# Patient Record
Sex: MALE | Race: WHITE | Employment: FULL TIME | URBAN - METROPOLITAN AREA
[De-identification: names, ages, dates, MRNs, and addresses within clinical notes are randomized per-mention and may not be internally consistent; named-entity substitution may affect disease eponyms.]

---

## 2017-07-13 RX ORDER — METOPROLOL SUCCINATE 25 MG/1
TABLET, EXTENDED RELEASE ORAL
Qty: 90 TAB | Refills: 2 | Status: SHIPPED | OUTPATIENT
Start: 2017-07-13 | End: 2018-04-10 | Stop reason: SDUPTHER

## 2017-10-13 DIAGNOSIS — N52.9 ERECTILE DYSFUNCTION, UNSPECIFIED ERECTILE DYSFUNCTION TYPE: ICD-10-CM

## 2017-10-13 RX ORDER — SILDENAFIL CITRATE 100 MG/1
TABLET, FILM COATED ORAL
Qty: 10 TAB | Refills: 5 | Status: SHIPPED | OUTPATIENT
Start: 2017-10-13 | End: 2017-10-17 | Stop reason: SINTOL

## 2017-10-17 ENCOUNTER — OFFICE VISIT (OUTPATIENT)
Dept: FAMILY MEDICINE CLINIC | Age: 47
End: 2017-10-17

## 2017-10-17 VITALS
WEIGHT: 175.4 LBS | DIASTOLIC BLOOD PRESSURE: 75 MMHG | OXYGEN SATURATION: 99 % | BODY MASS INDEX: 26.58 KG/M2 | RESPIRATION RATE: 18 BRPM | SYSTOLIC BLOOD PRESSURE: 112 MMHG | HEIGHT: 68 IN | HEART RATE: 60 BPM | TEMPERATURE: 97.7 F

## 2017-10-17 DIAGNOSIS — T50.905A ADVERSE EFFECT OF DRUG, INITIAL ENCOUNTER: ICD-10-CM
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
## 2017-10-17 DIAGNOSIS — G43.919 INTRACTABLE MIGRAINE WITHOUT STATUS MIGRAINOSUS, UNSPECIFIED MIGRAINE TYPE: ICD-10-CM

## 2017-10-17 DIAGNOSIS — N52.9 ERECTILE DYSFUNCTION, UNSPECIFIED ERECTILE DYSFUNCTION TYPE: Primary | ICD-10-CM

## 2017-10-17 DIAGNOSIS — E78.2 MIXED HYPERLIPIDEMIA: ICD-10-CM

## 2017-10-17 RX ORDER — TADALAFIL 20 MG/1
20 TABLET ORAL
Qty: 20 TAB | Refills: 1 | Status: SHIPPED | OUTPATIENT
Start: 2017-10-17 | End: 2018-10-26 | Stop reason: SDUPTHER

## 2017-10-17 RX ORDER — SUMATRIPTAN 100 MG/1
TABLET, FILM COATED ORAL
Qty: 9 TAB | Refills: 5 | Status: SHIPPED | OUTPATIENT
Start: 2017-10-17 | End: 2018-06-13 | Stop reason: SDUPTHER

## 2017-10-17 NOTE — MR AVS SNAPSHOT
Visit Information Date & Time Provider Department Dept. Phone Encounter #  
 10/17/2017 11:20 AM Yoni Carney NP [facility] [phone] [facility] [id number] Follow-up Instructions Return in about 4 months (around 2/17/2018), or if symptoms worsen or fail to improve, for hld. Upcoming Health Maintenance Date Due DTaP/Tdap/Td series (1 - Tdap) 10/10/1991 INFLUENZA AGE 9 TO ADULT 8/1/2017 Allergies as of 10/17/2017  Review Complete On: 10/17/2017 By: Mikel Crisostomo LPN Severity Noted Reaction Type Reactions Viagra [Sildenafil]  10/17/2017    Other (comments)  
 headaches Current Immunizations  Reviewed on 9/22/2015 No immunizations on file. Not reviewed this visit You Were Diagnosed With   
  
 Codes Comments Erectile dysfunction, unspecified erectile dysfunction type    -  Primary ICD-10-CM: N52.9 ICD-9-CM: 607.84 Adverse effect of drug, initial encounter     ICD-10-CM: T88. 7XXA ICD-9-CM: E947.9 Tick bite, initial encounter     ICD-10-CM: W57. Jimmye Rise ICD-9-CM: 919.4, E906.4 Mixed hyperlipidemia     ICD-10-CM: E78.2 ICD-9-CM: 272.2 Intractable migraine without status migrainosus, unspecified migraine type     ICD-10-CM: G43.919 ICD-9-CM: 346.91 Vitals BP Pulse Temp Resp Height(growth percentile) Weight(growth percentile) 112/75 (BP 1 Location: Left arm, BP Patient Position: Sitting) 60 97.7 °F (36.5 °C) (Oral) 18 5' 8\" (1.727 m) 175 lb 6.4 oz (79.6 kg) SpO2 BMI Smoking Status 99% 26.67 kg/m2 Never Smoker BMI and BSA Data Body Mass Index Body Surface Area  
 26.67 kg/m 2 1.95 m 2 Preferred Pharmacy Pharmacy Name Phone [id number] [address] [address] [phone] Your Updated Medication List  
  
   
This list is accurate as of: 10/17/17 12:24 PM.  Always use your most recent med list.  
  
  
  
  
 metoprolol succinate 25 mg XL tablet Commonly known as:  TOPROL-XL  
take 1 tablet by mouth once daily  
  
 simvastatin 10 mg tablet Commonly known as:  ZOCOR  
take 1 tablet by mouth at bedtime SUMAtriptan 100 mg tablet Commonly known as:  IMITREX  
TAKE 1 TABLET BY MOUTH DAILY IF NEEDED MIGRAINE  
  
 tadalafil 20 mg tablet Commonly known as:  CIALIS Take 1 Tab by mouth daily as needed. Prescriptions Sent to Pharmacy Refills  
 tadalafil (CIALIS) 20 mg tablet 1 Sig: Take 1 Tab by mouth daily as needed. Class: Normal  
 [facility] [place] [address] [facility], [address] [place] [address] Ph #: [phone] Route: Oral  
 SUMAtriptan (IMITREX) 100 mg tablet 5 Sig: TAKE 1 TABLET BY MOUTH DAILY IF NEEDED MIGRAINE Class: Normal  
 Pharmacy: [facility] [place] [address] [facility], [address] [place] [address] Ph #: [phone] Follow-up Instructions Return in about 4 months (around 2/17/2018), or if symptoms worsen or fail to improve, for hld. To-Do List   
 10/17/2017 Lab:  LIPID PANEL   
  
 10/17/2017 Lab:  LYME AB TOTAL W/RFLX W BLOT   
  
 10/17/2017 Lab:  METABOLIC PANEL, COMPREHENSIVE Patient Instructions Tadalafil (By mouth) Tadalafil (iq-QFQ-x-arben) Treats erectile dysfunction and the symptoms of benign prostatic hyperplasia (enlarged prostate). Also treats pulmonary arterial hypertension (high blood pressure in the lungs). Brand Name(s): Adcirca, Cialis There may be other brand names for this medicine. When This Medicine Should Not Be Used: This medicine is not right for everyone. Do not use it if you had an allergic reaction to tadalafil. How to Use This Medicine:  
Tablet · Take your medicine as directed. Your dose may need to be changed several times to find what works best for you. · Swallow the tablet whole. Do not split, break, or crush it. · Read and follow the patient instructions that come with this medicine. Talk to your doctor or pharmacist if you have any questions. · Missed dose: If you take this medicine once a day, take a missed dose as soon as you remember. If it is almost time for your next dose, wait until then and take a regular dose. Do not take extra medicine to make up for a missed dose. · Store the medicine in a closed container at room temperature, away from heat, moisture, and direct light. Drugs and Foods to Avoid: Ask your doctor or pharmacist before using any other medicine, including over-the-counter medicines, vitamins, and herbal products. · Do not use this medicine if you also use riociguat or a nitrate medicine. Do not take other medicines that contain tadalafil or similar medicines, such as sildenafil or vardenafil. · Some medicines and foods can affect how tadalafil works. Tell your doctor if you are using any of the following: ¨ Bosentan, carbamazepine, erythromycin, indinavir, itraconazole, ketoconazole, phenobarbital, phenytoin, rifampin, ritonavir ¨ Medicine to treat prostate problems or high blood pressure (including alfuzosin, amlodipine, bendroflumethiazide, candesartan, doxazosin, enalapril, losartan, metoprolol, tamsulosin, terazosin) · Do not eat grapefruit or drink grapefruit juice while you are using this medicine. Warnings While Using This Medicine: · Tell your doctor if you are pregnant or breastfeeding, or if you have kidney disease, liver disease, diabetes, high cholesterol, leukemia, multiple myeloma, sickle cell anemia, bleeding problems, a stomach ulcer, or eye problems. Tell your doctor if you have angina or chest pain during sex, heart disease, heart rhythm problems, high or low blood pressure, or a history of heart attack or stroke. Also tell your doctor if you smoke or drink alcohol. · Tell any doctor who treats you that you take tadalafil. · This medicine may cause the following problems:  
¨ Low blood pressure (especially if taken with other medicines that lower blood pressure) ¨ Painful or prolonged erection ¨ Hearing or vision problems · Keep all medicine out of the reach of children. Never share your medicine with anyone. Possible Side Effects While Using This Medicine:  
Call your doctor right away if you notice any of these side effects: · Allergic reaction: Itching or hives, swelling in your face or hands, swelling or tingling in your mouth or throat, chest tightness, trouble breathing · Blistering, peeling, or red skin rash · Chest pain · Lightheadedness, fainting · Painful erection or an erection that lasts longer than 4 hours · Sudden decrease in hearing or hearing loss, ringing in the ears, dizziness · Sudden loss of vision If you notice these less serious side effects, talk with your doctor: · Back or muscle pain · Headache · Stuffy or runny nose · Upset stomach, nausea · Warmth or redness in your face, neck, arms, or upper chest 
If you notice other side effects that you think are caused by this medicine, tell your doctor. Call your doctor for medical advice about side effects. You may report side effects to FDA at 9-636-FDA-0426 © 2017 [facility] Information is for End User's use only and may not be sold, redistributed or otherwise used for commercial purposes. The above information is an  only. It is not intended as medical advice for individual conditions or treatments. Talk to your doctor, nurse or pharmacist before following any medical regimen to see if it is safe and effective for you. Introducing [facility]! Dear Yehuda Young: Thank you for requesting a Suncore account. Our records indicate that you already have an active Suncore account. You can access your account anytime at [email or web address] Did you know that you can access your hospital and ER discharge instructions at any time in Appuri? You can also review all of your test results from your hospital stay or ER visit. Additional Information If you have questions, please visit the Frequently Asked Questions section of the Appuri website at [email or web address] Remember, Appoett is NOT to be used for urgent needs. For medical emergencies, dial 911. Now available from your iPhone and Android! Please provide this summary of care documentation to your next provider. Lyme Disease Testing Disclaimer:   
 § 83.2-6428.7. (Expires July 1, 2018) Lyme disease testing information disclosure. A. Every licensee or his in-office designee who orders a laboratory test for the presence of Lyme disease shall provide to the patient or his legal representative the following written information: \"ACCORDING TO THE CENTERS FOR DISEASE CONTROL AND PREVENTION, AS OF 2011 LYME DISEASE IS THE SIXTH FASTEST GROWING DISEASE IN THE [place]. YOUR HEALTH CARE PROVIDER HAS ORDERED A LABORATORY TEST FOR THE PRESENCE OF LYME DISEASE FOR YOU. CURRENT LABORATORY TESTING FOR LYME DISEASE CAN BE PROBLEMATIC AND STANDARD LABORATORY TESTS OFTEN RESULT IN FALSE NEGATIVE AND FALSE POSITIVE RESULTS, AND IF DONE TOO EARLY, YOU MAY NOT HAVE PRODUCED ENOUGH ANTIBODIES TO BE CONSIDERED POSITIVE BECAUSE YOUR IMMUNE RESPONSE REQUIRES TIME TO DEVELOP ANTIBODIES. IF YOU ARE TESTED FOR LYME DISEASE, AND THE RESULTS ARE NEGATIVE, THIS DOES NOT NECESSARILY MEAN YOU DO NOT HAVE LYME DISEASE. IF YOU CONTINUE TO EXPERIENCE SYMPTOMS, YOU SHOULD CONTACT YOUR HEALTH CARE PROVIDER AND INQUIRE ABOUT THE APPROPRIATENESS OF RETESTING OR ADDITIONAL TREATMENT. \"  
B. Licensees shall be immune from civil liability for the provision of the written information required by this section absent gross negligence or willful misconduct. Your primary care clinician is listed as Palomo Douglas. If you have any questions after today's visit, please call [phone].

## 2017-10-17 NOTE — PATIENT INSTRUCTIONS
Tadalafil (By mouth)   Tadalafil (mk-THZ-g-arben)  Treats erectile dysfunction and the symptoms of benign prostatic hyperplasia (enlarged prostate). Also treats pulmonary arterial hypertension (high blood pressure in the lungs). Brand Name(s): Adcirca, Cialis   There may be other brand names for this medicine. When This Medicine Should Not Be Used: This medicine is not right for everyone. Do not use it if you had an allergic reaction to tadalafil. How to Use This Medicine:   Tablet  · Take your medicine as directed. Your dose may need to be changed several times to find what works best for you. · Swallow the tablet whole. Do not split, break, or crush it. · Read and follow the patient instructions that come with this medicine. Talk to your doctor or pharmacist if you have any questions. · Missed dose: If you take this medicine once a day, take a missed dose as soon as you remember. If it is almost time for your next dose, wait until then and take a regular dose. Do not take extra medicine to make up for a missed dose. · Store the medicine in a closed container at room temperature, away from heat, moisture, and direct light. Drugs and Foods to Avoid:   Ask your doctor or pharmacist before using any other medicine, including over-the-counter medicines, vitamins, and herbal products. · Do not use this medicine if you also use riociguat or a nitrate medicine. Do not take other medicines that contain tadalafil or similar medicines, such as sildenafil or vardenafil. · Some medicines and foods can affect how tadalafil works.  Tell your doctor if you are using any of the following:  ¨ Bosentan, carbamazepine, erythromycin, indinavir, itraconazole, ketoconazole, phenobarbital, phenytoin, rifampin, ritonavir  ¨ Medicine to treat prostate problems or high blood pressure (including alfuzosin, amlodipine, bendroflumethiazide, candesartan, doxazosin, enalapril, losartan, metoprolol, tamsulosin, terazosin)  · Do not eat grapefruit or drink grapefruit juice while you are using this medicine. Warnings While Using This Medicine:   · Tell your doctor if you are pregnant or breastfeeding, or if you have kidney disease, liver disease, diabetes, high cholesterol, leukemia, multiple myeloma, sickle cell anemia, bleeding problems, a stomach ulcer, or eye problems. Tell your doctor if you have angina or chest pain during sex, heart disease, heart rhythm problems, high or low blood pressure, or a history of heart attack or stroke. Also tell your doctor if you smoke or drink alcohol. · Tell any doctor who treats you that you take tadalafil. · This medicine may cause the following problems:   ¨ Low blood pressure (especially if taken with other medicines that lower blood pressure)  ¨ Painful or prolonged erection  ¨ Hearing or vision problems  · Keep all medicine out of the reach of children. Never share your medicine with anyone. Possible Side Effects While Using This Medicine:   Call your doctor right away if you notice any of these side effects:  · Allergic reaction: Itching or hives, swelling in your face or hands, swelling or tingling in your mouth or throat, chest tightness, trouble breathing  · Blistering, peeling, or red skin rash  · Chest pain  · Lightheadedness, fainting  · Painful erection or an erection that lasts longer than 4 hours  · Sudden decrease in hearing or hearing loss, ringing in the ears, dizziness  · Sudden loss of vision  If you notice these less serious side effects, talk with your doctor:   · Back or muscle pain  · Headache  · Stuffy or runny nose  · Upset stomach, nausea  · Warmth or redness in your face, neck, arms, or upper chest  If you notice other side effects that you think are caused by this medicine, tell your doctor. Call your doctor for medical advice about side effects.  You may report side effects to FDA at 7-248-FDA-1147  © 2017 [address] Information is for End User's use only and may not be sold, redistributed or otherwise used for commercial purposes. The above information is an  only. It is not intended as medical advice for individual conditions or treatments. Talk to your doctor, nurse or pharmacist before following any medical regimen to see if it is safe and effective for you.

## 2017-10-17 NOTE — PROGRESS NOTES
Woody Wilson is a 52 y.o. male   Chief Complaint   Patient presents with    Tick Removal     \"I got bit by a tick a couple of weeks ago\"     Chief Complaint   Patient presents with    Tick Removal     \"I got bit by a tick a couple of weeks ago\"   1. Have you been to the ER, urgent care clinic since your last visit? Hospitalized since your last visit? No    2. Have you seen or consulted any other health care providers outside of the [address] [place] [address] since your last visit? Include any pap smears or colon screening.  No

## 2017-10-18 LAB
A-G RATIO,AGRAT: 2.1 RATIO (ref 1.1–2.6)
ALBUMIN SERPL-MCNC: 5 G/DL (ref 3.5–5)
ALP SERPL-CCNC: 51 U/L (ref 25–115)
ALT SERPL-CCNC: 21 U/L (ref 5–40)
ANION GAP SERPL CALC-SCNC: 18 MMOL/L
AST SERPL W P-5'-P-CCNC: 22 U/L (ref 10–37)
BILIRUB SERPL-MCNC: 0.5 MG/DL (ref 0.2–1.2)
BUN SERPL-MCNC: 17 MG/DL (ref 6–22)
CALCIUM SERPL-MCNC: 9.8 MG/DL (ref 8.4–10.4)
CHLORIDE SERPL-SCNC: 99 MMOL/L (ref 98–110)
CHOLEST SERPL-MCNC: 207 MG/DL (ref 110–200)
CO2 SERPL-SCNC: 25 MMOL/L (ref 20–32)
CREAT SERPL-MCNC: 1 MG/DL (ref 0.5–1.2)
GFRAA, 66117: >60
GFRNA, 66118: >60
GLOBULIN,GLOB: 2.4 G/DL (ref 2–4)
GLUCOSE SERPL-MCNC: 86 MG/DL (ref 70–99)
HDLC SERPL-MCNC: 57 MG/DL (ref 40–59)
LDLC SERPL CALC-MCNC: 133 MG/DL (ref 50–99)
POTASSIUM SERPL-SCNC: 4.3 MMOL/L (ref 3.5–5.5)
PROT SERPL-MCNC: 7.4 G/DL (ref 6.4–8.3)
SODIUM SERPL-SCNC: 142 MMOL/L (ref 133–145)
TRIGL SERPL-MCNC: 87 MG/DL (ref 40–149)
VLDLC SERPL CALC-MCNC: 17 MG/DL (ref 8–30)

## 2017-10-18 NOTE — PROGRESS NOTES
1. Have you been to the ER, urgent care clinic since your last visit? Hospitalized since your last visit? No    2. Have you seen or consulted any other health care providers outside of the [address] [place] [address] since your last visit? Include any pap smears or colon screening. No  Subjective:   Cayetano Median is a 52 y.o. male follow up hypercholesterolemia  Erectile dysfunction   Medication reaction to silenafil headaches per patient   ROS: denies chest pain, dyspnea, denies urinary symptoms  Current Outpatient Prescriptions   Medication Sig Dispense Refill    tadalafil (CIALIS) 20 mg tablet Take 1 Tab by mouth daily as needed. 20 Tab 1    SUMAtriptan (IMITREX) 100 mg tablet TAKE 1 TABLET BY MOUTH DAILY IF NEEDED MIGRAINE 9 Tab 5    metoprolol succinate (TOPROL-XL) 25 mg XL tablet take 1 tablet by mouth once daily 90 Tab 2    simvastatin (ZOCOR) 10 mg tablet take 1 tablet by mouth at bedtime 90 Tab 3         New concerns: bit by a tic on left side several weeks ago. Area itches and is slightly tender per patient. ROS: denies fever,chills, flu like symtoms  Objective:   Awake and alert in no acute distress  Neck supple without lymphadenopathy, no carotid artery bruits auscultated bilaterally. No thyromegaly  Lungs clear throughout  S1 S2 RRR without ectopy or murmur auscultated. Extremities: no clubbing, cyanosis, peripheral edema  Integumentary: scabbed insect bite lateral thorax mid region non tender  Visit Vitals    /75 (BP 1 Location: Left arm, BP Patient Position: Sitting)    Pulse 60    Temp 97.7 °F (36.5 °C) (Oral)    Resp 18    Ht 5' 8\" (1.727 m)    Wt 175 lb 6.4 oz (79.6 kg)    SpO2 99%    BMI 26.67 kg/m2    Diagnoses and all orders for this visit:    1. Erectile dysfunction, unspecified erectile dysfunction type  -     tadalafil (CIALIS) 20 mg tablet; Take 1 Tab by mouth daily as needed. 2. Adverse effect of drug, initial encounter  Stop viagra  3.  Tick bite, initial encounter  - LYME AB TOTAL W/RFLX W BLOT; Future    4. Mixed hyperlipidemia  -     LIPID PANEL; Future  -     METABOLIC PANEL, COMPREHENSIVE; Future    5. Intractable migraine without status migrainosus, unspecified migraine type refill  -     SUMAtriptan (IMITREX) 100 mg tablet; TAKE 1 TABLET BY MOUTH DAILY IF NEEDED MIGRAINE    Reviewed risks and benefits and common side effects of new medication  General comfort measures   mediterranean diet introduced patient verbalizes understanding    Patient verbalizes understanding. I have discussed the diagnosis with the patient and the intended plan as seen in the above orders. The patient has received an after-visit summary and questions were answered concerning future plans. I have discussed medication side effects and warnings with the patient as well. Follow-up Disposition:  Return in about 4 months (around 2/17/2018), or if symptoms worsen or fail to improve, for hld.

## 2017-11-10 ENCOUNTER — TELEPHONE (OUTPATIENT)
Dept: FAMILY MEDICINE CLINIC | Age: 47
End: 2017-11-10

## 2017-11-10 NOTE — TELEPHONE ENCOUNTER
Pt had labs done 10/17 and pt never received a call about his results. Pt want to know if pcp could review the results give him a call back asap.

## 2017-11-10 NOTE — TELEPHONE ENCOUNTER
Steve Mayes NP   to Zak Osman           10/24/17 2:00 PM   Labs stable---Keep up good work.   Jennifer Coronel 83

## 2017-12-29 DIAGNOSIS — E78.2 MIXED HYPERLIPIDEMIA: ICD-10-CM

## 2018-01-02 RX ORDER — SIMVASTATIN 10 MG/1
TABLET, FILM COATED ORAL
Qty: 90 TAB | Refills: 3 | Status: SHIPPED | OUTPATIENT
Start: 2018-01-02 | End: 2018-11-23 | Stop reason: SDUPTHER

## 2018-04-10 RX ORDER — METOPROLOL SUCCINATE 25 MG/1
TABLET, EXTENDED RELEASE ORAL
Qty: 90 TAB | Refills: 2 | Status: SHIPPED | OUTPATIENT
Start: 2018-04-10 | End: 2019-05-14 | Stop reason: SDUPTHER

## 2018-06-13 DIAGNOSIS — G43.919 INTRACTABLE MIGRAINE WITHOUT STATUS MIGRAINOSUS, UNSPECIFIED MIGRAINE TYPE: ICD-10-CM

## 2018-06-13 RX ORDER — SUMATRIPTAN 100 MG/1
TABLET, FILM COATED ORAL
Qty: 9 TAB | Refills: 5 | Status: SHIPPED | OUTPATIENT
Start: 2018-06-13 | End: 2018-11-26 | Stop reason: SDUPTHER

## 2018-08-14 ENCOUNTER — TELEPHONE (OUTPATIENT)
Dept: FAMILY MEDICINE CLINIC | Age: 48
End: 2018-08-14

## 2018-08-14 NOTE — TELEPHONE ENCOUNTER
Patient called in to check on the status of the prior auth for the medication cialis. Patient stated that form was faxed to the office. Please advise.

## 2018-08-14 NOTE — TELEPHONE ENCOUNTER
Prior authorization form has been sent to the insurance company for review. Left message for patient to return call to the office.

## 2018-08-20 NOTE — TELEPHONE ENCOUNTER
Pt states that his prior auth should have been submitted through [employer] he is not sure if it was sent correctly because he received a denial from [facility]

## 2018-08-20 NOTE — TELEPHONE ENCOUNTER
LDUMEQ:[id number];HPNVVS:[id number]; Review Type:Prior Auth; Coverage Start Date:07/21/2018; Coverage End Date:08/20/2019;

## 2018-10-26 DIAGNOSIS — N52.9 ERECTILE DYSFUNCTION, UNSPECIFIED ERECTILE DYSFUNCTION TYPE: ICD-10-CM

## 2018-10-26 RX ORDER — TADALAFIL 20 MG/1
20 TABLET ORAL
Qty: 20 TAB | Refills: 1 | Status: SHIPPED | OUTPATIENT
Start: 2018-10-26 | End: 2018-11-23

## 2018-10-26 NOTE — TELEPHONE ENCOUNTER
Requested Prescriptions     Pending Prescriptions Disp Refills    tadalafil (CIALIS) 20 mg tablet 20 Tab 1     Sig: Take 1 Tab by mouth daily as needed. Patient is requesting all prescriptions to go to [facility] from now on.

## 2018-10-29 ENCOUNTER — TELEPHONE (OUTPATIENT)
Dept: FAMILY MEDICINE CLINIC | Age: 48
End: 2018-10-29

## 2018-10-29 NOTE — TELEPHONE ENCOUNTER
Pt is now under wifes insurance and the insurance will not cover the strength of the 20 mg for the Cialis. If Pt wants to keep the 20mg he needs to file appeal b/c it is considered non formulary. If he does the 5mg it is covered but he'd need a prior auth, but he'd need more of a quanity b/c the dose is lowered. [phone] is his cell    [phone]  Number for appeal    [phone] number for prior [facility] is the new insurance. Please advise.

## 2018-11-01 NOTE — TELEPHONE ENCOUNTER
Called [facility] [employer] for appeal was redirected and was told to have patient call and initiate the appeal. Contacted Mr. Matthew Bishop to let him know that the appeal needed to be started it by him, patient verbalized understanding and would call back once appeal has been started.  -Tabby Sheehan LPN

## 2018-11-14 NOTE — TELEPHONE ENCOUNTER
Patient is calling to see if the doctor can write the Cialis medication for 5mg instead of 20mg and to have 30 tablets dispensed for a 30 day supply. He spoke with his insurance company and there is no appeal process to have the medication covered under his new insurance plan. He also states that he is willing to pay the out of pocket cost to receive the medication, however the 5 mg is cheaper and more affordable than the 20mg. Patients would like this medication also sent to the [place] [address]. IF there are any questions or concerns he asked to be contacted at [phone].

## 2018-11-23 ENCOUNTER — OFFICE VISIT (OUTPATIENT)
Dept: FAMILY MEDICINE CLINIC | Age: 48
End: 2018-11-23

## 2018-11-23 VITALS
BODY MASS INDEX: 28.98 KG/M2 | HEART RATE: 64 BPM | DIASTOLIC BLOOD PRESSURE: 90 MMHG | OXYGEN SATURATION: 99 % | WEIGHT: 191.2 LBS | HEIGHT: 68 IN | TEMPERATURE: 97.7 F | RESPIRATION RATE: 19 BRPM | SYSTOLIC BLOOD PRESSURE: 140 MMHG

## 2018-11-23 DIAGNOSIS — R35.0 URINARY FREQUENCY: ICD-10-CM

## 2018-11-23 DIAGNOSIS — R35.1 NOCTURIA: Primary | ICD-10-CM

## 2018-11-23 DIAGNOSIS — N52.9 ERECTILE DYSFUNCTION, UNSPECIFIED ERECTILE DYSFUNCTION TYPE: ICD-10-CM

## 2018-11-23 DIAGNOSIS — E78.2 MIXED HYPERLIPIDEMIA: ICD-10-CM

## 2018-11-23 PROBLEM — K64.9 HEMORRHOIDS: Status: ACTIVE | Noted: 2018-11-23

## 2018-11-23 RX ORDER — SIMVASTATIN 10 MG/1
TABLET, FILM COATED ORAL
Qty: 90 TAB | Refills: 3 | Status: SHIPPED | OUTPATIENT
Start: 2018-11-23 | End: 2019-05-14 | Stop reason: SDUPTHER

## 2018-11-23 RX ORDER — SIMVASTATIN 10 MG/1
TABLET, FILM COATED ORAL
Qty: 90 TAB | Refills: 3 | Status: SHIPPED | OUTPATIENT
Start: 2018-11-23 | End: 2018-11-23

## 2018-11-23 RX ORDER — TADALAFIL 5 MG/1
5 TABLET ORAL DAILY
Qty: 90 TAB | Refills: 3 | Status: SHIPPED | OUTPATIENT
Start: 2018-11-23 | End: 2020-01-23

## 2018-11-23 NOTE — PROGRESS NOTES
Chief Complaint   Patient presents with    Other     medication refill      1. Have you been to the ER, urgent care clinic since your last visit? Hospitalized since your last visit? No     2. Have you seen or consulted any other health care providers outside of the [address] [place] [address] since your last visit? Include any pap smears or colon screening.  No

## 2018-11-23 NOTE — PROGRESS NOTES
Subjective:   Stan Hayward is a 50 y.o. male he is here today for refill for Cialis which he takes for nocturia and urinary frequency and erectile dysfunction. He would like to be on Cialis 5 mg daily it helped him before with the nocturia and urinary frequency but  had to stop because of cost and lack of insurance. He is now under his wife's insurance and would like to discuss going back on Cialis 5 mg daily  Does urinate often at night but denies any urinary stream changes   Patient also would like a refill for simvastatin  Review of Systems   Constitutional: Negative. Cardiovascular: Negative. Genitourinary: Positive for frequency. Negative for dysuria, flank pain, hematuria and urgency. Current Outpatient Medications   Medication Sig Dispense Refill    SUMAtriptan (IMITREX) 100 mg tablet take 1 tablet by mouth daily if needed for MIFRAINE 9 Tab 5    metoprolol succinate (TOPROL-XL) 25 mg XL tablet take 1 tablet by mouth once daily 90 Tab 2    simvastatin (ZOCOR) 10 mg tablet take 1 tablet by mouth at bedtime 90 Tab 3    tadalafil (CIALIS) 20 mg tablet Take 1 Tab by mouth daily as needed. 21 Tab 1      PMH: reviewed medications and allergy lists and medical and family history. OBJECTIVE:  Awake and alert in no acute distress  Lungs clear throughout  S1 S2 RRR without ectopy or murmur auscultated. Abdomen: normoactive bowel sounds all quadrants, no tenderness to abdomen upper and lower quadrants. No hepatosplenomegaly    Visit Vitals  /90 (BP 1 Location: Left arm, BP Patient Position: Sitting)   Pulse 64   Temp 97.7 °F (36.5 °C) (Oral)   Resp 19   Ht 5' 8\" (1.727 m)   Wt 191 lb 3.2 oz (86.7 kg)   SpO2 99%   BMI 29.07 kg/m²     Diagnoses and all orders for this visit:    Nocturia    tadalafil (CIALIS) 5 mg tablet; Take 1 Tab by mouth daily. , Normal, Disp-90 Tab, R-3  Urinary Frequency   -     tadalafil (CIALIS) 5 mg tablet; Take 1 Tab by mouth daily. , Normal, Disp-90 Tab, R-3  Erectile dysfunction, unspecified erectile dysfunction type  -     tadalafil (CIALIS) 5 mg tablet; Take 1 Tab by mouth daily. , Normal, Disp-90 Tab, R-3  Mixed hyperlipidemia  -     Discontinue: simvastatin (ZOCOR) 10 mg tablet; take 1 tablet by mouth at bedtime, Print, Disp-90 Tab, R-3  -     simvastatin (ZOCOR) 10 mg tablet; take 1 tablet by mouth at bedtime, Print, Disp-90 Tab, R-3    Health maintenance reviewed  Reviewed risks and benefits and common side effects of new medication  General comfort measures  Patient agrees with plan and verbalizes understanding. I have discussed the diagnosis with the patient and the intended plan as seen in the above orders. The patient has received an after-visit summary and questions were answered concerning future plans. I have discussed medication side effects and warnings with the patient as well. Follow-up Disposition:  Return if symptoms worsen or fail to improve.

## 2018-11-26 DIAGNOSIS — G43.919 INTRACTABLE MIGRAINE WITHOUT STATUS MIGRAINOSUS, UNSPECIFIED MIGRAINE TYPE: ICD-10-CM

## 2018-11-26 RX ORDER — SUMATRIPTAN 100 MG/1
TABLET, FILM COATED ORAL
Qty: 9 TAB | Refills: 5 | Status: SHIPPED | OUTPATIENT
Start: 2018-11-26 | End: 2019-06-19 | Stop reason: SDUPTHER

## 2019-02-28 ENCOUNTER — TELEPHONE (OUTPATIENT)
Dept: FAMILY MEDICINE CLINIC | Age: 49
End: 2019-02-28

## 2019-02-28 NOTE — TELEPHONE ENCOUNTER
Called and spoke with patient regarding Physical form dropped off at the office 2/27/19. Patient needs appointment for a CPE in order to be seen. The only available before 3/12 is on NP Lans schedule, he was placed accordingly.

## 2019-03-06 ENCOUNTER — OFFICE VISIT (OUTPATIENT)
Dept: FAMILY MEDICINE CLINIC | Age: 49
End: 2019-03-06

## 2019-03-06 VITALS
RESPIRATION RATE: 18 BRPM | HEART RATE: 63 BPM | DIASTOLIC BLOOD PRESSURE: 82 MMHG | WEIGHT: 190.2 LBS | OXYGEN SATURATION: 97 % | SYSTOLIC BLOOD PRESSURE: 119 MMHG | HEIGHT: 68 IN | BODY MASS INDEX: 28.82 KG/M2 | TEMPERATURE: 97.8 F

## 2019-03-06 DIAGNOSIS — Z00.00 PHYSICAL EXAM, ROUTINE: Primary | ICD-10-CM

## 2019-03-06 NOTE — PROGRESS NOTES
HPI  Ray Mayberry is a 50 y.o. male who presents today for physical exam for pre employment. 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 Pt has a agility test scheduled for March 22, 2019 and needed to have physical exam and paperwork completed to determine physical ability to participate in the agility test.  Pt denies any other acute medical concerns today. Current Outpatient Medications   Medication Sig Dispense Refill    SUMAtriptan (IMITREX) 100 mg tablet take 1 tablet by mouth daily if needed for MIFRAINE 9 Tab 5    tadalafil (CIALIS) 5 mg tablet Take 1 Tab by mouth daily. 90 Tab 3    simvastatin (ZOCOR) 10 mg tablet take 1 tablet by mouth at bedtime 90 Tab 3    metoprolol succinate (TOPROL-XL) 25 mg XL tablet take 1 tablet by mouth once daily 90 Tab 2      Allergies   Allergen Reactions    Viagra [Sildenafil] Other (comments)     headaches       SUBJECTIVE:  Review of Systems   Constitutional: Negative for chills, fever and malaise/fatigue. HENT: Negative for congestion, ear pain, sinus pain and sore throat. Eyes: Negative for blurred vision, pain, discharge and redness. Respiratory: Negative for shortness of breath and wheezing. Cardiovascular: Negative for chest pain, palpitations and leg swelling. Gastrointestinal: Negative for abdominal pain, constipation, nausea and vomiting. Genitourinary: Negative for dysuria, frequency, hematuria and urgency. Musculoskeletal: Negative for falls, joint pain and myalgias. Skin: Negative for rash. Neurological: Negative for dizziness, tingling, sensory change and headaches.         OBJECTIVE:  Visit Vitals  /82 (BP 1 Location: Left arm, BP Patient Position: Sitting)   Pulse 63   Temp 97.8 °F (36.6 °C) (Oral)   Resp 18   Ht 5' 8\" (1.727 m)   Wt 190 lb 3.2 oz (86.3 kg)   SpO2 97%   BMI 28.92 kg/m²      Physical Exam   Constitutional: He is oriented to person, place, and time and well-developed, well-nourished, and in no distress. HENT:   Head: Normocephalic. Right Ear: Hearing, tympanic membrane, external ear and ear canal normal.   Left Ear: Hearing, tympanic membrane, external ear and ear canal normal.   Nose: No mucosal edema. Right sinus exhibits no maxillary sinus tenderness and no frontal sinus tenderness. Left sinus exhibits no maxillary sinus tenderness and no frontal sinus tenderness. Mouth/Throat: Uvula is midline. No posterior oropharyngeal edema or posterior oropharyngeal erythema. Eyes: Conjunctivae and EOM are normal. Pupils are equal, round, and reactive to light. Neck: Normal range of motion. Neck supple. No thyromegaly present. Cardiovascular: Normal rate, regular rhythm and normal heart sounds. Pulmonary/Chest: Effort normal and breath sounds normal. He has no wheezes. He has no rales. He exhibits no tenderness. Abdominal: Soft. Bowel sounds are normal. He exhibits no distension. There is no tenderness. There is no guarding. Musculoskeletal: He exhibits no edema or tenderness. Lymphadenopathy:     He has no cervical adenopathy. Neurological: He is alert and oriented to person, place, and time. No cranial nerve deficit. Gait normal.   Skin: Skin is warm and dry. No erythema. Psychiatric: Mood and affect normal.   Nursing note and vitals reviewed. ASSESSMENT:  Diagnoses and all orders for this visit:    1. Physical exam, routine      PLAN:  Unremarkable exam  Continue current medication regimen  Paperwork for employment agility test completed    I have discussed the diagnosis with the patient and the intended plan as seen in the above orders. The patient has received an after-visit summary and questions were answered concerning future plans. I have discussed medication side effects and warnings with the patient as well. Patient will call for further questions.     Follow-up Disposition:  Return in about 1 year (around 3/6/2020) for physical with PCP.     Jerome Suárez NP

## 2019-03-06 NOTE — PROGRESS NOTES
Chief Complaint   Patient presents with    Employment Physical     1. Have you been to the ER, urgent care clinic since your last visit? Hospitalized since your last visit? No    2. Have you seen or consulted any other health care providers outside of the [address] [place] [address] since your last visit? Include any pap smears or colon screening.  No

## 2019-03-06 NOTE — PATIENT INSTRUCTIONS

## 2019-03-20 ENCOUNTER — TELEPHONE (OUTPATIENT)
Dept: FAMILY MEDICINE CLINIC | Age: 49
End: 2019-03-20

## 2019-03-20 NOTE — TELEPHONE ENCOUNTER
Li Salazar with [facility] re: Cialis.      Stated PA need was faxed to the office 03/16/2019 and they are checking the status     Please route to NAOMI Rangel

## 2019-05-14 DIAGNOSIS — E78.2 MIXED HYPERLIPIDEMIA: ICD-10-CM

## 2019-05-14 RX ORDER — SIMVASTATIN 10 MG/1
TABLET, FILM COATED ORAL
Qty: 90 TAB | Refills: 0 | Status: SHIPPED | OUTPATIENT
Start: 2019-05-14 | End: 2019-08-29 | Stop reason: SDUPTHER

## 2019-05-14 RX ORDER — METOPROLOL SUCCINATE 25 MG/1
TABLET, EXTENDED RELEASE ORAL
Qty: 90 TAB | Refills: 0 | Status: SHIPPED | OUTPATIENT
Start: 2019-05-14 | End: 2019-08-29 | Stop reason: SDUPTHER

## 2019-06-19 DIAGNOSIS — G43.919 INTRACTABLE MIGRAINE WITHOUT STATUS MIGRAINOSUS, UNSPECIFIED MIGRAINE TYPE: ICD-10-CM

## 2019-06-20 ENCOUNTER — TELEPHONE (OUTPATIENT)
Dept: FAMILY MEDICINE CLINIC | Age: 49
End: 2019-06-20

## 2019-06-20 RX ORDER — SUMATRIPTAN 100 MG/1
TABLET, FILM COATED ORAL
Qty: 9 TAB | Refills: 4 | Status: SHIPPED | OUTPATIENT
Start: 2019-06-20 | End: 2019-06-21 | Stop reason: SDUPTHER

## 2019-06-20 NOTE — TELEPHONE ENCOUNTER
Antonia Calzada with [facility] needs clarification on:  SUMAtriptan (IMITREX) 100 mg tablet     Stated directions need to be clarified in example \"max per day\"

## 2019-06-21 DIAGNOSIS — G43.919 INTRACTABLE MIGRAINE WITHOUT STATUS MIGRAINOSUS, UNSPECIFIED MIGRAINE TYPE: ICD-10-CM

## 2019-06-21 RX ORDER — SUMATRIPTAN 100 MG/1
100 TABLET, FILM COATED ORAL
Qty: 9 TAB | Refills: 4 | Status: SHIPPED | OUTPATIENT
Start: 2019-06-21 | End: 2019-06-21

## 2019-08-29 DIAGNOSIS — E78.2 MIXED HYPERLIPIDEMIA: ICD-10-CM

## 2019-08-29 DIAGNOSIS — E78.2 MIXED HYPERLIPIDEMIA: Primary | ICD-10-CM

## 2019-08-29 RX ORDER — SIMVASTATIN 10 MG/1
TABLET, FILM COATED ORAL
Qty: 90 TAB | Refills: 0 | Status: SHIPPED | OUTPATIENT
Start: 2019-08-29 | End: 2019-12-19 | Stop reason: SDUPTHER

## 2019-08-29 RX ORDER — METOPROLOL SUCCINATE 25 MG/1
TABLET, EXTENDED RELEASE ORAL
Qty: 90 TAB | Refills: 0 | Status: SHIPPED | OUTPATIENT
Start: 2019-08-29 | End: 2019-12-19 | Stop reason: SDUPTHER

## 2019-12-19 DIAGNOSIS — E78.2 MIXED HYPERLIPIDEMIA: ICD-10-CM

## 2019-12-20 RX ORDER — SIMVASTATIN 10 MG/1
10 TABLET, FILM COATED ORAL
Qty: 90 TAB | Refills: 0 | Status: SHIPPED | OUTPATIENT
Start: 2019-12-20 | End: 2020-04-22 | Stop reason: SDUPTHER

## 2019-12-20 RX ORDER — METOPROLOL SUCCINATE 25 MG/1
TABLET, EXTENDED RELEASE ORAL
Qty: 90 TAB | Refills: 0 | Status: SHIPPED | OUTPATIENT
Start: 2019-12-20 | End: 2020-04-22 | Stop reason: SDUPTHER

## 2020-03-02 ENCOUNTER — OFFICE VISIT (OUTPATIENT)
Dept: FAMILY MEDICINE CLINIC | Age: 50
End: 2020-03-02

## 2020-03-02 VITALS
DIASTOLIC BLOOD PRESSURE: 72 MMHG | SYSTOLIC BLOOD PRESSURE: 124 MMHG | HEART RATE: 66 BPM | RESPIRATION RATE: 16 BRPM | TEMPERATURE: 97.6 F | HEIGHT: 68 IN | BODY MASS INDEX: 28.49 KG/M2 | WEIGHT: 188 LBS | OXYGEN SATURATION: 98 %

## 2020-03-02 DIAGNOSIS — K21.9 GASTROESOPHAGEAL REFLUX DISEASE, ESOPHAGITIS PRESENCE NOT SPECIFIED: ICD-10-CM

## 2020-03-02 DIAGNOSIS — M62.838 TRAPEZIUS MUSCLE SPASM: Primary | ICD-10-CM

## 2020-03-02 DIAGNOSIS — E78.2 MIXED HYPERLIPIDEMIA: ICD-10-CM

## 2020-03-02 DIAGNOSIS — N52.9 ERECTILE DYSFUNCTION, UNSPECIFIED ERECTILE DYSFUNCTION TYPE: ICD-10-CM

## 2020-03-02 RX ORDER — SUMATRIPTAN 100 MG/1
TABLET, FILM COATED ORAL
COMMUNITY
Start: 2020-01-21 | End: 2020-08-13 | Stop reason: SDUPTHER

## 2020-03-02 RX ORDER — FAMOTIDINE 20 MG/1
20 TABLET, FILM COATED ORAL
Qty: 60 TAB | Refills: 2 | Status: SHIPPED | OUTPATIENT
Start: 2020-03-02 | End: 2020-12-01 | Stop reason: ALTCHOICE

## 2020-03-02 RX ORDER — CYCLOBENZAPRINE HCL 10 MG
10 TABLET ORAL
Qty: 20 TAB | Refills: 0 | Status: SHIPPED | OUTPATIENT
Start: 2020-03-02 | End: 2020-12-01 | Stop reason: ALTCHOICE

## 2020-03-02 NOTE — PROGRESS NOTES
Chief Complaint   Patient presents with    Cholesterol Problem     1. Have you been to the ER, urgent care clinic since your last visit? Hospitalized since your last visit? No    2. Have you seen or consulted any other health care providers outside of the [address] [place] [address] since your last visit? Include any pap smears or colon screening.  No

## 2020-03-02 NOTE — PATIENT INSTRUCTIONS
Neck Spasm: Care Instructions Your Care Instructions A neck spasm is sudden tightness and pain in your neck muscles. A spasm may be caused by some activities or repeated movements. For example, you may be more likely to have a neck spasm if you slouch, paint a ceiling, work at a computer, or sleep with your neck twisted. But the cause isn't always clear. Home treatment includes using heat or ice, taking over-the-counter (OTC) pain medicines, and avoiding activities that may lead to neck pain. Gentle stretching, or treatments such as massage or manipulation, may also help ease a neck spasm. For a neck spasm that doesn't get better with home care, your doctor may prescribe medicine. He or she may also suggest exercise or physical therapy to help strengthen or relax your neck muscles. Follow-up care is a key part of your treatment and safety. Be sure to make and go to all appointments, and call your doctor if you are having problems. It's also a good idea to know your test results and keep a list of the medicines you take. How can you care for yourself at home? · To relieve pain, use heat or ice (whichever feels better) on the affected area. ? Put a warm water bottle, a heating pad set on low, or a warm cloth on your neck. Put a thin cloth between the heating pad and your skin. Do not go to sleep with a heating pad on your skin. ? Try ice or a cold pack on the area for 10 to 20 minutes at a time. Put a thin cloth between the ice and your skin. · Ask your doctor if you can take acetaminophen (such as Tylenol) or nonsteroidal anti-inflammatory drugs, such as ibuprofen or naproxen. Your doctor can prescribe stronger medicines if needed. Be safe with medicines. Read and follow all instructions on the label. · Stretch your muscles every day, especially before and after exercise and at bedtime. Regular stretching can help relax your muscles.  
· Try to find a pillow and a position in bed that help improve your night's rest. 
· Try to stay active. It's best to start activity slowly. If an exercise makes your pain worse, stop doing it. When should you call for help? Call 911 anytime you think you may need emergency care. For example, call if: 
  · You are unable to move an arm or a leg at all.  
Lonell Mower your doctor now or seek immediate medical care if: 
  · You have new or worse symptoms in your arms, legs, belly, or buttocks. Symptoms may include: 
? Numbness or tingling. ? Weakness. ? Pain.  
  · You lose bladder or bowel control.  
 Watch closely for changes in your health, and be sure to contact your doctor if: 
  · You do not get better as expected. Where can you learn more? Go to http://cricket-denae.info/. Enter Q529 in the search box to learn more about \"Neck Spasm: Care Instructions. \" Current as of: June 26, 2019 Content Version: 12.2 © 8997-6556 [employer]. Care instructions adapted under license by [employer] (which disclaims liability or warranty for this information). If you have questions about a medical condition or this instruction, always ask your healthcare professional. [place] [address] any warranty or liability for your use of this information. Cyclobenzaprine (Flexeril, Amrix, Fexmid, FusePaq Tabradol) - (By mouth) Why this medicine is used:  
Treats pain and stiffness caused by muscle spasms. Contact a nurse or doctor right away if you have: · Anxiety, restlessness, twitching · Seeing or hearing things that are not there · Fast, pounding, or uneven heartbeat · Fever, sweating, nausea, vomiting, diarrhea Common side effects: · Dry mouth · Dizziness, drowsiness © 2017 [facility] Information is for End User's use only and may not be sold, redistributed or otherwise used for commercial purposes. Gastroesophageal Reflux Disease (GERD): Care Instructions Your Care Instructions Gastroesophageal reflux disease (GERD) is the backward flow of stomach acid into the esophagus. The esophagus is the tube that leads from your throat to your stomach. A one-way valve prevents the stomach acid from moving up into this tube. When you have GERD, this valve does not close tightly enough. If you have mild GERD symptoms including heartburn, you may be able to control the problem with antacids or over-the-counter medicine. Changing your diet, losing weight, and making other lifestyle changes can also help reduce symptoms. Follow-up care is a key part of your treatment and safety. Be sure to make and go to all appointments, and call your doctor if you are having problems. It's also a good idea to know your test results and keep a list of the medicines you take. How can you care for yourself at home? · Take your medicines exactly as prescribed. Call your doctor if you think you are having a problem with your medicine. · Your doctor may recommend over-the-counter medicine. For mild or occasional indigestion, antacids, such as Tums, Gaviscon, Mylanta, or Maalox, may help. Your doctor also may recommend over-the-counter acid reducers, such as Pepcid AC, Tagamet HB, Zantac 75, or Prilosec. Read and follow all instructions on the label. If you use these medicines often, talk with your doctor. · Change your eating habits. ? It's best to eat several small meals instead of two or three large meals. ? After you eat, wait 2 to 3 hours before you lie down. ? Chocolate, mint, and alcohol can make GERD worse. ? Spicy foods, foods that have a lot of acid (like tomatoes and oranges), and coffee can make GERD symptoms worse in some people. If your symptoms are worse after you eat a certain food, you may want to stop eating that food to see if your symptoms get better. · Do not smoke or chew tobacco. Smoking can make GERD worse.  If you need help quitting, talk to your doctor about stop-smoking programs and medicines. These can increase your chances of quitting for good. · If you have GERD symptoms at night, raise the head of your bed 6 to 8 inches by putting the frame on blocks or placing a foam wedge under the head of your mattress. (Adding extra pillows does not work.) · Do not wear tight clothing around your middle. · Lose weight if you need to. Losing just 5 to 10 pounds can help. When should you call for help? Call your doctor now or seek immediate medical care if: 
  · You have new or different belly pain.  
  · Your stools are black and tarlike or have streaks of blood.  
 Watch closely for changes in your health, and be sure to contact your doctor if: 
  · Your symptoms have not improved after 2 days.  
  · Food seems to catch in your throat or chest.  
Where can you learn more? Go to http://cricket-denae.info/. Enter J050 in the search box to learn more about \"Gastroesophageal Reflux Disease (GERD): Care Instructions. \" Current as of: November 7, 2018 Content Version: 12.2 © 2247-8520 [employer]. Care instructions adapted under license by [employer] (which disclaims liability or warranty for this information). If you have questions about a medical condition or this instruction, always ask your healthcare professional. [address] any warranty or liability for your use of this information. Famotidine (By mouth) Famotidine ([id number]) Treats ulcers, gastroesophageal reflux disease (GERD), and conditions that cause the stomach to produce too much stomach acid. Also treats heartburn caused by acid indigestion. Brand Name(s): Acid Controller, Acid Reducer, [facility] Acid Reducer, Good Sense Acid Reducer, Heartburn Relief, Leader Acid Reducer, Pepcid, Pepcid AC, Quality Choice Acid Controller, [employer] Acid Reducer, [facility] Famotidine Acid Reducer, TopCare Acid Reducer There may be other brand names for this medicine. When This Medicine Should Not Be Used: You should not use this medicine if you have had an allergic reaction to famotidine or to similar medicines such as ranitidine (Zantac®), cimetidine (Tagamet®), or nizatidine (Axid®). How to Use This Medicine:  
Tablet, Chewable Tablet, Dissolving Tablet, Liquid · Your doctor will tell you how much medicine to use. Do not use more than directed. · Follow the instructions on the medicine label if you are using this medicine without a prescription. · The chewable tablet must be chewed completely before you swallow it. · If you are using the disintegrating tablet, make sure your hands are dry before you handle the tablet. Do not open the blister pack that contains the tablet until you are ready to take it. Remove the tablet from the blister pack by peeling back the foil, then taking the tablet out. Do not push the tablet through the foil. Place the tablet in your mouth. It should melt within 2 minutes. Swallow after the tablet has melted. · Shake the oral liquid medicine for 5 to 10 seconds before each use. Measure the medicine with a marked measuring spoon or medicine cup. If a dose is missed: · Take a dose as soon as you remember. If it is almost time for your next dose, wait until then and take a regular dose. Do not take extra medicine to make up for a missed dose. How to Store and Dispose of This Medicine: · Store the medicine in a closed container at room temperature, away from heat, moisture, and direct light. Do not freeze the oral liquid. · Ask your pharmacist, doctor, or health caregiver about the best way to dispose of any outdated medicine or medicine no longer needed. Throw away any unused oral liquid that is more than 3month old. · Keep all medicine out of the reach of children. Never share your medicine with anyone. Drugs and Foods to Avoid: Ask your doctor or pharmacist before using any other medicine, including over-the-counter medicines, vitamins, and herbal products. Warnings While Using This Medicine: · Make sure your doctor knows if you are pregnant or breastfeeding, or if you have kidney disease or liver disease. · This medicine might contain phenylalanine (aspartame). This is only a concern if you have a disorder called phenylketonuria (a problem with amino acids). If you have this condition, talk to your doctor before using this medicine. · Call your doctor if your symptoms do not improve or if they get worse. Possible Side Effects While Using This Medicine:  
Call your doctor right away if you notice any of these side effects: · Allergic reaction: Itching or hives, swelling in your face or hands, swelling or tingling in your mouth or throat, chest tightness, trouble breathing · Blistering, peeling, or red skin rash. · Dark-colored urine or pale stools. · Fast, pounding, or uneven heartbeat. · Fever, chills, cough, sore throat, and body aches. · Seizures. · Unusual bleeding, bruising, or weakness. · Yellowing of your skin or the whites of your eyes. If you notice these less serious side effects, talk with your doctor: · Constipation, diarrhea, or upset stomach. · Headache or dizziness. · Nausea or vomiting. If you notice other side effects that you think are caused by this medicine, tell your doctor. Call your doctor for medical advice about side effects. You may report side effects to FDA at 8-060-FDA-8652 © 2017 [facility] Information is for End User's use only and may not be sold, redistributed or otherwise used for commercial purposes. The above information is an  only. It is not intended as medical advice for individual conditions or treatments. Talk to your doctor, nurse or pharmacist before following any medical regimen to see if it is safe and effective for you. Body Mass Index: Care Instructions Your Care Instructions Body mass index (BMI) can help you see if your weight is raising your risk for health problems. It uses a formula to compare how much you weigh with how tall you are. · A BMI lower than 18.5 is considered underweight. · A BMI between 18.5 and 24.9 is considered healthy. · A BMI between 25 and 29.9 is considered overweight. A BMI of 30 or higher is considered obese. If your BMI is in the normal range, it means that you have a lower risk for weight-related health problems. If your BMI is in the overweight or obese range, you may be at increased risk for weight-related health problems, such as high blood pressure, heart disease, stroke, arthritis or joint pain, and diabetes. If your BMI is in the underweight range, you may be at increased risk for health problems such as fatigue, lower protection (immunity) against illness, muscle loss, bone loss, hair loss, and hormone problems. BMI is just one measure of your risk for weight-related health problems. You may be at higher risk for health problems if you are not active, you eat an unhealthy diet, or you drink too much alcohol or use tobacco products. Follow-up care is a key part of your treatment and safety. Be sure to make and go to all appointments, and call your doctor if you are having problems. It's also a good idea to know your test results and keep a list of the medicines you take. How can you care for yourself at home? · Practice healthy eating habits. This includes eating plenty of fruits, vegetables, whole grains, lean protein, and low-fat dairy. · If your doctor recommends it, get more exercise. Walking is a good choice. Bit by bit, increase the amount you walk every day. Try for at least 30 minutes on most days of the week. · Do not smoke. Smoking can increase your risk for health problems.  If you need help quitting, talk to your doctor about stop-smoking programs and medicines. These can increase your chances of quitting for good. · Limit alcohol to 2 drinks a day for men and 1 drink a day for women. Too much alcohol can cause health problems. If you have a BMI higher than 25 · Your doctor may do other tests to check your risk for weight-related health problems. This may include measuring the distance around your waist. A waist measurement of more than 40 inches in men or 35 inches in women can increase the risk of weight-related health problems. · Talk with your doctor about steps you can take to stay healthy or improve your health. You may need to make lifestyle changes to lose weight and stay healthy, such as changing your diet and getting regular exercise. If you have a BMI lower than 18.5 · Your doctor may do other tests to check your risk for health problems. · Talk with your doctor about steps you can take to stay healthy or improve your health. You may need to make lifestyle changes to gain or maintain weight and stay healthy, such as getting more healthy foods in your diet and doing exercises to build muscle. Where can you learn more? Go to http://cricket-denae.info/. Enter S176 in the search box to learn more about \"Body Mass Index: Care Instructions. \" Current as of: March 28, 2019 Content Version: 12.2 © 3710-6695 [employer]. Care instructions adapted under license by [employer] (which disclaims liability or warranty for this information). If you have questions about a medical condition or this instruction, always ask your healthcare professional. [address] any warranty or liability for your use of this information.

## 2020-03-02 NOTE — PROGRESS NOTES
Subjective:   Cayetano Wooten is a 52 y.o. male with hyperlipidemia and history of migraine  Patient wants Viagra taken off of his medication allergy list   Patient also is overdue for labs for HLD  Patient reports that several days ago he was taking down [place] lights and leaned back and lost footing and with lights in his hands he felt that he may have pulled a muscle in his neck  Pain aching and tight on neck region into right trapezius  Has not taken any medications over the counter   Pain severity moderate    Review of Systems   Respiratory: Negative. Cardiovascular: Negative. Gastrointestinal: Positive for heartburn. Negative for abdominal pain, blood in stool, constipation, diarrhea, melena, nausea and vomiting. Several months duration   Has not tried anything over the counter for relief    Musculoskeletal: Negative for back pain and falls. Neurological: Negative for dizziness, tingling and headaches. Current Outpatient Medications   Medication Sig Dispense Refill    SUMAtriptan (IMITREX) 100 mg tablet       tadalafil (CIALIS) 5 mg tablet TAKE ONE TABLET BY MOUTH EVERY DAY 90 Tab 0    metoprolol succinate (TOPROL-XL) 25 mg XL tablet TAKE 1 TABLET BY MOUTH DAILY 90 Tab 0    simvastatin (ZOCOR) 10 mg tablet Take 1 Tab by mouth nightly. 90 Tab 0    mineral oil liquid Take 15 mL by mouth every eight (8) hours as needed for Constipation. 473 mL 2    LORazepam (ATIVAN) 0.5 mg tablet Take 1-2 Tabs by mouth every eight (8) hours as needed for Anxiety (muscle spasm). Max Daily Amount: 3 mg. 45 Tab 0      PMH: reviewed medications and allergy lists and medical and family history. OBJECTIVE:  Awake and alert in no acute distress  Lungs clear throughout]  S1 S2 RRR without ectopy or murmur auscultated. Abdomen: normoactive bowel sounds all quadrants, no tenderness to abdomen upper and lower quadrants.  No hepatosplenomegaly  Inspection: no erythema no edema no warmth to palpation  Cervical paraspinals no tenderness to palpation over paraspinals  Palpable spasm right trapezius   No limited ROM to head neck or upper extremities      Visit Vitals  /72 (BP 1 Location: Left arm, BP Patient Position: Sitting)   Pulse 66   Temp 97.6 °F (36.4 °C) (Oral)   Resp 16   Ht 5' 8\" (1.727 m)   Wt 188 lb (85.3 kg)   SpO2 98%   BMI 28.59 kg/m²     Diagnoses and all orders for this visit:    Trapezius muscle spasm  -     cyclobenzaprine (FLEXERIL) 10 mg tablet; Take 1 Tab by mouth two (2) times daily as needed for Muscle Spasm(s). , Normal, Disp-20 Tab, R-0    Gastroesophageal reflux disease, esophagitis presence not specified  -     famotidine (PEPCID) 20 mg tablet; Take 1 Tab by mouth two (2) times daily as needed (Gastroesophageal reflux symptoms). , Normal, Disp-60 Tab, R-2  -     CBC W/O DIFF; Future    Mixed hyperlipidemia  -     METABOLIC PANEL, COMPREHENSIVE; Future  -     LIPID PANEL; Future    Erectile dysfunction, unspecified erectile dysfunction type  -     METABOLIC PANEL, COMPREHENSIVE; Future    BMI 28.0-28.9,adult    GERD diet   General comfort measures   May use topical heat or ice to right trapezius   Should be self-limiting   RTO for failure of resolution of symptoms   Reviewed risks and benefits and common side effects of new medication  Patient agrees with plan and verbalizes understanding. I have discussed the diagnosis with the patient and the intended plan as seen in the above orders. The patient has received an after-visit summary and questions were answered concerning future plans. I have discussed medication side effects and warnings with the patient as well. Follow-up and Dispositions    · Return in about 6 months (around 9/2/2020), or if symptoms worsen or fail to improve, for HLD.

## 2020-03-03 LAB
ALBUMIN SERPL-MCNC: 4.7 G/DL (ref 4–5)
ALBUMIN/GLOB SERPL: 2.2 {RATIO} (ref 1.2–2.2)
ALP SERPL-CCNC: 55 IU/L (ref 39–117)
ALT SERPL-CCNC: 41 IU/L (ref 0–44)
AST SERPL-CCNC: 24 IU/L (ref 0–40)
BILIRUB SERPL-MCNC: 0.5 MG/DL (ref 0–1.2)
BUN SERPL-MCNC: 14 MG/DL (ref 6–24)
BUN/CREAT SERPL: 12 (ref 9–20)
CALCIUM SERPL-MCNC: 10 MG/DL (ref 8.7–10.2)
CHLORIDE SERPL-SCNC: 104 MMOL/L (ref 96–106)
CHOLEST SERPL-MCNC: 247 MG/DL (ref 100–199)
CO2 SERPL-SCNC: 25 MMOL/L (ref 20–29)
CREAT SERPL-MCNC: 1.19 MG/DL (ref 0.76–1.27)
ERYTHROCYTE [DISTWIDTH] IN BLOOD BY AUTOMATED COUNT: 12.9 % (ref 11.6–15.4)
GLOBULIN SER CALC-MCNC: 2.1 G/DL (ref 1.5–4.5)
GLUCOSE SERPL-MCNC: 95 MG/DL (ref 65–99)
HCT VFR BLD AUTO: 47.2 % (ref 37.5–51)
HDLC SERPL-MCNC: 44 MG/DL
HGB BLD-MCNC: 16.5 G/DL (ref 13–17.7)
LDLC SERPL CALC-MCNC: 132 MG/DL (ref 0–99)
MCH RBC QN AUTO: 32.1 PG (ref 26.6–33)
MCHC RBC AUTO-ENTMCNC: 35 G/DL (ref 31.5–35.7)
MCV RBC AUTO: 92 FL (ref 79–97)
PLATELET # BLD AUTO: 208 X10E3/UL (ref 150–450)
POTASSIUM SERPL-SCNC: 5 MMOL/L (ref 3.5–5.2)
PROT SERPL-MCNC: 6.8 G/DL (ref 6–8.5)
RBC # BLD AUTO: 5.14 X10E6/UL (ref 4.14–5.8)
SODIUM SERPL-SCNC: 142 MMOL/L (ref 134–144)
TRIGL SERPL-MCNC: 354 MG/DL (ref 0–149)
VLDLC SERPL CALC-MCNC: 71 MG/DL (ref 5–40)
WBC # BLD AUTO: 5.4 X10E3/UL (ref 3.4–10.8)

## 2020-04-22 DIAGNOSIS — E78.2 MIXED HYPERLIPIDEMIA: ICD-10-CM

## 2020-04-22 RX ORDER — TADALAFIL 5 MG/1
TABLET ORAL
Qty: 90 TAB | Refills: 0 | Status: SHIPPED | OUTPATIENT
Start: 2020-04-22 | End: 2020-08-13 | Stop reason: SDUPTHER

## 2020-04-22 RX ORDER — METOPROLOL SUCCINATE 25 MG/1
TABLET, EXTENDED RELEASE ORAL
Qty: 90 TAB | Refills: 0 | Status: SHIPPED | OUTPATIENT
Start: 2020-04-22 | End: 2020-08-13 | Stop reason: SDUPTHER

## 2020-04-22 RX ORDER — SIMVASTATIN 10 MG/1
10 TABLET, FILM COATED ORAL
Qty: 90 TAB | Refills: 0 | Status: SHIPPED | OUTPATIENT
Start: 2020-04-22 | End: 2020-08-13 | Stop reason: SDUPTHER

## 2020-04-22 NOTE — TELEPHONE ENCOUNTER
Last Visit: 3/2/20 with REINA Rangel  Next Appointment: 9/4/20 with NP Schimming  Previous Refill Encounter(s): 12/20/19 Toprol & Zocor #90, 1/23/20 Cialis #90    Requested Prescriptions     Pending Prescriptions Disp Refills    metoprolol succinate (TOPROL-XL) 25 mg XL tablet 90 Tab 0     Sig: TAKE 1 TABLET BY MOUTH DAILY    simvastatin (ZOCOR) 10 mg tablet 90 Tab 0     Sig: Take 1 Tab by mouth nightly.     tadalafiL (CIALIS) 5 mg tablet 90 Tab 0     Sig: TAKE ONE TABLET BY MOUTH EVERY DAY

## 2020-08-13 DIAGNOSIS — E78.2 MIXED HYPERLIPIDEMIA: ICD-10-CM

## 2020-08-13 NOTE — TELEPHONE ENCOUNTER
Last Visit: 3/2/20 with NP Claire  Next Appointment: 9/4/20 with NP Claire  Previous Refill Encounter(s): 4/22/20 Cialis, Zocor & Toprol #90, 6/21/19 Imitrex #9 with 4 refills    Requested Prescriptions     Pending Prescriptions Disp Refills    metoprolol succinate (TOPROL-XL) 25 mg XL tablet 90 Tab 0     Sig: TAKE 1 TABLET BY MOUTH DAILY    simvastatin (ZOCOR) 10 mg tablet 90 Tab 0     Sig: Take 1 Tab by mouth nightly.  SUMAtriptan (IMITREX) 100 mg tablet 9 Tab 4     Sig: Take 1 Tab by mouth daily as needed for Migraine.     tadalafiL (CIALIS) 5 mg tablet 90 Tab 0     Sig: TAKE ONE TABLET BY MOUTH EVERY DAY

## 2020-08-14 RX ORDER — SIMVASTATIN 10 MG/1
10 TABLET, FILM COATED ORAL
Qty: 90 TAB | Refills: 0 | Status: SHIPPED | OUTPATIENT
Start: 2020-08-14 | End: 2020-12-01 | Stop reason: SDUPTHER

## 2020-08-14 RX ORDER — METOPROLOL SUCCINATE 25 MG/1
TABLET, EXTENDED RELEASE ORAL
Qty: 90 TAB | Refills: 0 | Status: SHIPPED | OUTPATIENT
Start: 2020-08-14 | End: 2020-12-01 | Stop reason: SDUPTHER

## 2020-08-14 RX ORDER — TADALAFIL 5 MG/1
TABLET ORAL
Qty: 90 TAB | Refills: 0 | Status: SHIPPED | OUTPATIENT
Start: 2020-08-14 | End: 2020-12-01 | Stop reason: SDUPTHER

## 2020-08-14 RX ORDER — SUMATRIPTAN 100 MG/1
100 TABLET, FILM COATED ORAL
Qty: 9 TAB | Refills: 4 | Status: SHIPPED | OUTPATIENT
Start: 2020-08-14 | End: 2020-12-01 | Stop reason: SDUPTHER

## 2020-12-01 ENCOUNTER — VIRTUAL VISIT (OUTPATIENT)
Dept: FAMILY MEDICINE CLINIC | Age: 50
End: 2020-12-01
Payer: COMMERCIAL

## 2020-12-01 DIAGNOSIS — R10.9 FLANK PAIN: Primary | ICD-10-CM

## 2020-12-01 DIAGNOSIS — E78.2 MIXED HYPERLIPIDEMIA: ICD-10-CM

## 2020-12-01 PROCEDURE — 99214 OFFICE O/P EST MOD 30 MIN: CPT | Performed by: FAMILY MEDICINE

## 2020-12-01 RX ORDER — SUMATRIPTAN 100 MG/1
100 TABLET, FILM COATED ORAL
Qty: 9 TAB | Refills: 4 | Status: SHIPPED | OUTPATIENT
Start: 2020-12-01 | End: 2022-09-01 | Stop reason: SDUPTHER

## 2020-12-01 RX ORDER — SIMVASTATIN 10 MG/1
10 TABLET, FILM COATED ORAL
Qty: 90 TAB | Refills: 3 | Status: SHIPPED | OUTPATIENT
Start: 2020-12-01 | End: 2022-03-09

## 2020-12-01 RX ORDER — METOPROLOL SUCCINATE 25 MG/1
TABLET, EXTENDED RELEASE ORAL
Qty: 90 TAB | Refills: 3 | Status: SHIPPED | OUTPATIENT
Start: 2020-12-01 | End: 2022-03-09

## 2020-12-01 RX ORDER — TADALAFIL 5 MG/1
TABLET ORAL
Qty: 90 TAB | Refills: 3 | Status: SHIPPED | OUTPATIENT
Start: 2020-12-01

## 2020-12-01 NOTE — PROGRESS NOTES
Natividad Terrell is a 48 y.o. male who was seen by synchronous (real-time) audio-video technology on 12/1/2020 for Other (GFR < 60 at 58)        Assessment & Plan:   Diagnoses and all orders for this visit:    1. Flank pain    2. Mixed hyperlipidemia  -     METABOLIC PANEL, COMPREHENSIVE; Future  -     LIPID PANEL; Future  -     simvastatin (ZOCOR) 10 mg tablet; Take 1 Tab by mouth nightly. Other orders  -     metoprolol succinate (TOPROL-XL) 25 mg XL tablet; TAKE 1 TABLET BY MOUTH DAILY  -     SUMAtriptan (IMITREX) 100 mg tablet; Take 1 Tab by mouth daily as needed for Migraine.  -     tadalafiL (CIALIS) 5 mg tablet; TAKE ONE TABLET BY MOUTH EVERY DAY. As above, not controlled   treatment plan as listed below  Orders Placed This Encounter    METABOLIC PANEL, COMPREHENSIVE    LIPID PANEL    metoprolol succinate (TOPROL-XL) 25 mg XL tablet    simvastatin (ZOCOR) 10 mg tablet    SUMAtriptan (IMITREX) 100 mg tablet    tadalafiL (CIALIS) 5 mg tablet     Refilled meds needed  Labs as ordered  Pt advised to stop meds that may have caused flank pain  Flank pain is apparently chronic; per chart review  712  Subjective:     Pt does testosterone therapy and gets blood work every 6 months. States that his last test revealed some kidney insufficiency. His GFR was 53. He was advised to have a follow up visit with his PCP; States at the time he was working out at the end of August.  He has stopped working out recently. He had been taking creatine and protein- L glutamyl. He stopped these after his labs test. He may not always hydrate much. Pt has had anxiety; he has always had \" anxiety\" ; he does not think med helped; Advised against taking med if he does not need it . States that his right flank area is \" sore\" often; Worse in the am. Has been present for a few months.  This has been listed as a chronic dx for pt per chart review     Lab Results   Component Value Date/Time    Sodium 143 12/04/2020 10:48 AM    Potassium 5.0 12/04/2020 10:48 AM    Chloride 104 12/04/2020 10:48 AM    CO2 25 12/04/2020 10:48 AM    Anion gap 18.0 10/17/2017 12:33 PM    Glucose 85 12/04/2020 10:48 AM    BUN 16 12/04/2020 10:48 AM    Creatinine 1.33 (H) 12/04/2020 10:48 AM    BUN/Creatinine ratio 12 12/04/2020 10:48 AM    GFR est AA 72 12/04/2020 10:48 AM    GFR est non-AA 62 12/04/2020 10:48 AM    Calcium 9.9 12/04/2020 10:48 AM           Prior to Admission medications    Medication Sig Start Date End Date Taking? Authorizing Provider   metoprolol succinate (TOPROL-XL) 25 mg XL tablet TAKE 1 TABLET BY MOUTH DAILY 12/1/20  Yes Evelin Yoder MD   simvastatin (ZOCOR) 10 mg tablet Take 1 Tab by mouth nightly. 12/1/20  Yes Evelin Yoder MD   SUMAtriptan (IMITREX) 100 mg tablet Take 1 Tab by mouth daily as needed for Migraine.  12/1/20  Yes Evelin Yoder MD   tadalafiL (CIALIS) 5 mg tablet TAKE ONE TABLET BY MOUTH EVERY DAY. 12/1/20  Yes Evelin Yoder MD     Patient Active Problem List    Diagnosis Date Noted    Hemorrhoids 11/23/2018    Erectile dysfunction 11/04/2016    Intractable migraine without status migrainosus 11/04/2016    Mixed hyperlipidemia 11/04/2016    Social phobia 09/27/2010    Flank pain 08/19/2010    Scrotal pain 08/19/2010     No Known Allergies  Past Medical History:   Diagnosis Date    ADD (attention deficit disorder)     Anxiety     Arrhythmia     proximal A-fib    GERD (gastroesophageal reflux disease)     Hypertension     Ill-defined condition     ELEVATED CHOLESTEROL    Insomnia     Migraine      Past Surgical History:   Procedure Laterality Date    HX BREAST BIOPSY  8/1/2014    BILATERAL BREAST BIOPSY performed by Breanna Sims MD at [address] [place] [address] HX MOHS PROCEDURES Right 2014    HX ORTHOPAEDIC      rotator cuff    HX UROLOGICAL      bilat vasectomy; Dr Lisa Paris  4/21/10    removal marce epidydimytis due to pain from vasectomy; Dr. Layo Love     Family History Problem Relation Age of Onset    Heart Disease Father         bypass    Heart Disease Paternal Grandfather         triple bypass     Social History     Tobacco Use    Smoking status: Never Smoker    Smokeless tobacco: Never Used   Substance Use Topics    Alcohol use: Yes     Comment: occasional       Review of Systems   Constitutional: Negative for chills and fever. Gastrointestinal: Negative for constipation and diarrhea. Genitourinary: Negative for dysuria, frequency and urgency. Objective:   No flowsheet data found. General: alert, cooperative, no distress   Mental  status: normal mood, behavior, speech, dress, motor activity, and thought processes, able to follow commands   HENT: NCAT   Neck: no visualized mass   Resp: no respiratory distress   Neuro: no gross deficits   Skin: no discoloration or lesions of concern on visible areas   Psychiatric: normal affect, consistent with stated mood, no evidence of hallucinations     Additional exam findings: none      We discussed the expected course, resolution and complications of the diagnosis(es) in detail. Medication risks, benefits, costs, interactions, and alternatives were discussed as indicated. I advised him to contact the office if his condition worsens, changes or fails to improve as anticipated. He expressed understanding with the diagnosis(es) and plan. Murlai Zamarripa, who was evaluated through a patient-initiated, synchronous (real-time) audio-video encounter, and/or his healthcare decision maker, is aware that it is a billable service, with coverage as determined by his insurance carrier. He provided verbal consent to proceed: Yes, and patient identification was verified. It was conducted pursuant to the emergency declaration under the [address] [place] [address], [address] [place] [address] authority and the [employer] General Act. A caregiver was present when appropriate.  Ability to conduct physical exam was limited. I was in the office. The patient was at home.       Eula Mayorga MD

## 2020-12-04 ENCOUNTER — APPOINTMENT (OUTPATIENT)
Dept: FAMILY MEDICINE CLINIC | Age: 50
End: 2020-12-04

## 2020-12-04 DIAGNOSIS — E78.2 MIXED HYPERLIPIDEMIA: ICD-10-CM

## 2020-12-05 LAB
ALBUMIN SERPL-MCNC: 4.9 G/DL (ref 4–5)
ALBUMIN/GLOB SERPL: 2.1 {RATIO} (ref 1.2–2.2)
ALP SERPL-CCNC: 52 IU/L (ref 39–117)
ALT SERPL-CCNC: 27 IU/L (ref 0–44)
AST SERPL-CCNC: 18 IU/L (ref 0–40)
BILIRUB SERPL-MCNC: 0.5 MG/DL (ref 0–1.2)
BUN SERPL-MCNC: 16 MG/DL (ref 6–24)
BUN/CREAT SERPL: 12 (ref 9–20)
CALCIUM SERPL-MCNC: 9.9 MG/DL (ref 8.7–10.2)
CHLORIDE SERPL-SCNC: 104 MMOL/L (ref 96–106)
CHOLEST SERPL-MCNC: 242 MG/DL (ref 100–199)
CO2 SERPL-SCNC: 25 MMOL/L (ref 20–29)
CREAT SERPL-MCNC: 1.33 MG/DL (ref 0.76–1.27)
GLOBULIN SER CALC-MCNC: 2.3 G/DL (ref 1.5–4.5)
GLUCOSE SERPL-MCNC: 85 MG/DL (ref 65–99)
HDLC SERPL-MCNC: 46 MG/DL
LDLC SERPL CALC-MCNC: 164 MG/DL (ref 0–99)
POTASSIUM SERPL-SCNC: 5 MMOL/L (ref 3.5–5.2)
PROT SERPL-MCNC: 7.2 G/DL (ref 6–8.5)
SODIUM SERPL-SCNC: 143 MMOL/L (ref 134–144)
SPECIMEN STATUS REPORT, ROLRST: NORMAL
TRIGL SERPL-MCNC: 173 MG/DL (ref 0–149)
VLDLC SERPL CALC-MCNC: 32 MG/DL (ref 5–40)

## 2020-12-06 NOTE — PATIENT INSTRUCTIONS
Flank Pain: Care Instructions  Your Care Instructions  Flank pain is pain on the side of the back just below the rib cage and above the waist. It can be on one or both sides. Flank pain has many possible causes, including a kidney stone, a urinary tract infection, or back strain. Flank pain may get better on its own. But don't ignore new symptoms, such as fever, nausea and vomiting, urination problems, pain that gets worse, and dizziness. These may be signs of a more serious problem. You may have to have tests or other treatment. Follow-up care is a key part of your treatment and safety. Be sure to make and go to all appointments, and call your doctor if you are having problems. It's also a good idea to know your test results and keep a list of the medicines you take. How can you care for yourself at home? · Rest until you feel better. · Take pain medicines exactly as directed. ? If the doctor gave you a prescription medicine for pain, take it as prescribed. ? If you are not taking a prescription pain medicine, ask your doctor if you can take an over-the-counter pain medicine, such as acetaminophen (Tylenol), ibuprofen (Advil, Motrin), or naproxen (Aleve). Read and follow all instructions on the label. · If your doctor prescribed antibiotics, take them as directed. Do not stop taking them just because you feel better. You need to take the full course of antibiotics. · To apply heat, put a warm water bottle, a heating pad set on low, or a warm cloth on the painful area. Do not go to sleep with a heating pad on your skin. · To prevent dehydration, drink plenty of fluids, enough so that your urine is light yellow or clear like water. Choose water and other caffeine-free clear liquids until you feel better. If you have kidney, heart, or liver disease and have to limit fluids, talk with your doctor before you increase the amount of fluids you drink. When should you call for help?    Call your doctor now or seek immediate medical care if:    · Your flank pain gets worse.     · You have new symptoms, such as fever, nausea, or vomiting.     · You have symptoms of a urinary problem. For example:  ? You have blood or pus in your urine. ? You have chills or body aches. ? It hurts to urinate. ? You have groin or belly pain. Watch closely for changes in your health, and be sure to contact your doctor if you do not get better as expected. Where can you learn more? Go to http://www.hooks.com/  Enter S191 in the search box to learn more about \"Flank Pain: Care Instructions. \"  Current as of: June 26, 2019               Content Version: 12.6  © 7615-4303 [employer]. Care instructions adapted under license by [employer] (which disclaims liability or warranty for this information). If you have questions about a medical condition or this instruction, always ask your healthcare professional. [place] [address] any warranty or liability for your use of this information.

## 2022-03-18 PROBLEM — K64.9 HEMORRHOIDS: Status: ACTIVE | Noted: 2018-11-23

## 2022-09-01 ENCOUNTER — OFFICE VISIT (OUTPATIENT)
Dept: FAMILY MEDICINE CLINIC | Age: 52
End: 2022-09-01
Payer: COMMERCIAL

## 2022-09-01 VITALS
OXYGEN SATURATION: 96 % | HEART RATE: 70 BPM | HEIGHT: 68 IN | SYSTOLIC BLOOD PRESSURE: 107 MMHG | WEIGHT: 188 LBS | TEMPERATURE: 97.9 F | RESPIRATION RATE: 16 BRPM | BODY MASS INDEX: 28.49 KG/M2 | DIASTOLIC BLOOD PRESSURE: 71 MMHG

## 2022-09-01 DIAGNOSIS — Z12.11 SCREENING FOR COLON CANCER: ICD-10-CM

## 2022-09-01 DIAGNOSIS — G43.919 INTRACTABLE MIGRAINE WITHOUT STATUS MIGRAINOSUS, UNSPECIFIED MIGRAINE TYPE: ICD-10-CM

## 2022-09-01 DIAGNOSIS — E78.2 MIXED HYPERLIPIDEMIA: Primary | ICD-10-CM

## 2022-09-01 DIAGNOSIS — N52.9 ERECTILE DYSFUNCTION, UNSPECIFIED ERECTILE DYSFUNCTION TYPE: ICD-10-CM

## 2022-09-01 PROCEDURE — 99214 OFFICE O/P EST MOD 30 MIN: CPT | Performed by: FAMILY MEDICINE

## 2022-09-01 RX ORDER — METOPROLOL SUCCINATE 25 MG/1
25 TABLET, EXTENDED RELEASE ORAL DAILY
Qty: 90 TABLET | Refills: 1 | Status: SHIPPED | OUTPATIENT
Start: 2022-09-01

## 2022-09-01 RX ORDER — SUMATRIPTAN 100 MG/1
100 TABLET, FILM COATED ORAL
Qty: 9 TABLET | Refills: 4 | Status: SHIPPED | OUTPATIENT
Start: 2022-09-01

## 2022-09-01 RX ORDER — SIMVASTATIN 10 MG/1
10 TABLET, FILM COATED ORAL
Qty: 90 TABLET | Refills: 1 | Status: SHIPPED | OUTPATIENT
Start: 2022-09-01

## 2022-09-01 NOTE — PATIENT INSTRUCTIONS
DASH Diet: Care Instructions  Your Care Instructions     The DASH diet is an eating plan that can help lower your blood pressure. DASH stands for Dietary Approaches to Stop Hypertension. Hypertension is high blood pressure. The DASH diet focuses on eating foods that are high in calcium, potassium, and magnesium. These nutrients can lower blood pressure. The foods that are highest in these nutrients are fruits, vegetables, low-fat dairy products, nuts, seeds, and legumes. But taking calcium, potassium, and magnesium supplements instead of eating foods that are high in those nutrients does not have the same effect. The DASH diet also includes whole grains, fish, and poultry. The DASH diet is one of several lifestyle changes your doctor may recommend to lower your high blood pressure. Your doctor may also want you to decrease the amount of sodium in your diet. Lowering sodium while following the DASH diet can lower blood pressure even further than just the DASH diet alone. Follow-up care is a key part of your treatment and safety. Be sure to make and go to all appointments, and call your doctor if you are having problems. It's also a good idea to know your test results and keep a list of the medicines you take. How can you care for yourself at home? Following the DASH diet  Eat 4 to 5 servings of fruit each day. A serving is 1 medium-sized piece of fruit, ½ cup chopped or canned fruit, 1/4 cup dried fruit, or 4 ounces (½ cup) of fruit juice. Choose fruit more often than fruit juice. Eat 4 to 5 servings of vegetables each day. A serving is 1 cup of lettuce or raw leafy vegetables, ½ cup of chopped or cooked vegetables, or 4 ounces (½ cup) of vegetable juice. Choose vegetables more often than vegetable juice. Get 2 to 3 servings of low-fat and fat-free dairy each day. A serving is 8 ounces of milk, 1 cup of yogurt, or 1 ½ ounces of cheese. Eat 6 to 8 servings of grains each day.  A serving is 1 slice of bread, 1 ounce of dry cereal, or ½ cup of cooked rice, pasta, or cooked cereal. Try to choose whole-grain products as much as possible. Limit lean meat, poultry, and fish to 2 servings each day. A serving is 3 ounces, about the size of a deck of cards. Eat 4 to 5 servings of nuts, seeds, and legumes (cooked dried beans, lentils, and split peas) each week. A serving is 1/3 cup of nuts, 2 tablespoons of seeds, or ½ cup of cooked beans or peas. Limit fats and oils to 2 to 3 servings each day. A serving is 1 teaspoon of vegetable oil or 2 tablespoons of salad dressing. Limit sweets and added sugars to 5 servings or less a week. A serving is 1 tablespoon jelly or jam, ½ cup sorbet, or 1 cup of lemonade. Eat less than 2,300 milligrams (mg) of sodium a day. If you limit your sodium to 1,500 mg a day, you can lower your blood pressure even more. Be aware that all of these are the suggested number of servings for people who eat 1,800 to 2,000 calories a day. Your recommended number of servings may be different if you need more or fewer calories. Tips for success  Start small. Do not try to make dramatic changes to your diet all at once. You might feel that you are missing out on your favorite foods and then be more likely to not follow the plan. Make small changes, and stick with them. Once those changes become habit, add a few more changes. Try some of the following:  Make it a goal to eat a fruit or vegetable at every meal and at snacks. This will make it easy to get the recommended amount of fruits and vegetables each day. Try yogurt topped with fruit and nuts for a snack or healthy dessert. Add lettuce, tomato, cucumber, and onion to sandwiches. Combine a ready-made pizza crust with low-fat mozzarella cheese and lots of vegetable toppings. Try using tomatoes, squash, spinach, broccoli, carrots, cauliflower, and onions.   Have a variety of cut-up vegetables with a low-fat dip as an appetizer instead of chips and dip.  Sprinkle sunflower seeds or chopped almonds over salads. Or try adding chopped walnuts or almonds to cooked vegetables. Try some vegetarian meals using beans and peas. Add garbanzo or kidney beans to salads. Make burritos and tacos with mashed shaw beans or black beans. Where can you learn more? Go to http://www.hooks.com/  Enter H967 in the search box to learn more about \"DASH Diet: Care Instructions. \"  Current as of: January 10, 2022               Content Version: 13.2  © 8923-8815 [employer]. Care instructions adapted under license by [employer] (which disclaims liability or warranty for this information). If you have questions about a medical condition or this instruction, always ask your healthcare professional. [address] any warranty or liability for your use of this information.

## 2022-09-01 NOTE — PROGRESS NOTES
1. \"Have you been to the ER, urgent care clinic since your last visit? Hospitalized since your last visit? \" No    2. \"Have you seen or consulted any other health care providers outside of the [address] [place] [address] since your last visit? \" No     3. For patients aged 39-70: Has the patient had a colonoscopy / FIT/ Cologuard?  No

## 2022-09-06 NOTE — PROGRESS NOTES
HPI:  Donn Castillo is a 46 y.o. male who presents today with   Chief Complaint   Patient presents with    Cholesterol Problem      Patient is here to follow-up on hypercholesterolemia. Patient is on simvastatin for his cholesterol. He continues to take metoprolol, Imitrex, and Cialis. He is in need of refills on these medications. He has a history of migraine and erectile dysfunction. He has no new complaints today    He is in agreement to getting colon cancer screening referral          3 most recent PHQ Screens 9/1/2022   Little interest or pleasure in doing things Not at all   Feeling down, depressed, irritable, or hopeless Not at all   Total Score PHQ 2 0               PMH,  Meds, Allergies, Family History, Social history reviewed      Current Outpatient Medications   Medication Sig Dispense Refill    SUMAtriptan (IMITREX) 100 mg tablet Take 1 Tablet by mouth daily as needed for Migraine. 9 Tablet 4    simvastatin (ZOCOR) 10 mg tablet Take 1 Tablet by mouth nightly. T 90 Tablet 1    metoprolol succinate (TOPROL-XL) 25 mg XL tablet Take 1 Tablet by mouth daily. 90 Tablet 1    tadalafiL (CIALIS) 5 mg tablet TAKE ONE TABLET BY MOUTH EVERY DAY.  (Patient not taking: Reported on 9/1/2022) 90 Tab 3        No Known Allergies               ROS as per HPI      Visit Vitals  /71 (BP 1 Location: Right arm, BP Patient Position: Sitting, BP Cuff Size: Large adult)   Pulse 70   Temp 97.9 °F (36.6 °C) (Temporal)   Resp 16   Ht 5' 8\" (1.727 m)   Wt 188 lb (85.3 kg)   SpO2 96%   BMI 28.59 kg/m²     Physical Exam    General appearance: alert, cooperative, no distress, appears stated age  Neck: supple, symmetrical, trachea midline, no adenopathy, thyroid: not enlarged, symmetric, no tenderness/mass/nodules, no carotid bruit and no JVD  Lungs: clear to auscultation bilaterally  Heart: regular rate and rhythm, S1, S2 normal, no murmur, click, rub or gallop    Extremities: extremities normal, atraumatic, no cyanosis or edema      Lab Results   Component Value Date/Time    Cholesterol, total 242 (H) 12/04/2020 10:48 AM    HDL Cholesterol 46 12/04/2020 10:48 AM    LDL, calculated 164 (H) 12/04/2020 10:48 AM    LDL, calculated 132 (H) 03/02/2020 11:37 AM    VLDL, calculated 32 12/04/2020 10:48 AM    VLDL, calculated 71 (H) 03/02/2020 11:37 AM    Triglyceride 173 (H) 12/04/2020 10:48 AM     Lab Results   Component Value Date/Time    Sodium 143 12/04/2020 10:48 AM    Potassium 5.0 12/04/2020 10:48 AM    Chloride 104 12/04/2020 10:48 AM    CO2 25 12/04/2020 10:48 AM    Anion gap 18.0 10/17/2017 12:33 PM    Glucose 85 12/04/2020 10:48 AM    BUN 16 12/04/2020 10:48 AM    Creatinine 1.33 (H) 12/04/2020 10:48 AM    BUN/Creatinine ratio 12 12/04/2020 10:48 AM    GFR est AA 72 12/04/2020 10:48 AM    GFR est non-AA 62 12/04/2020 10:48 AM    Calcium 9.9 12/04/2020 10:48 AM    Bilirubin, total 0.5 12/04/2020 10:48 AM    Alk. phosphatase 52 12/04/2020 10:48 AM    Protein, total 7.2 12/04/2020 10:48 AM    Albumin 4.9 12/04/2020 10:48 AM    Globulin 2.4 10/17/2017 12:33 PM    A-G Ratio 2.1 12/04/2020 10:48 AM    ALT (SGPT) 27 12/04/2020 10:48 AM    AST (SGOT) 18 12/04/2020 10:48 AM         Assessment/Plan:    Diagnoses and all orders for this visit:    1. Mixed hyperlipidemia  -     simvastatin (ZOCOR) 10 mg tablet; Take 1 Tablet by mouth nightly. T  -     LIPID PANEL; Future  -     METABOLIC PANEL, COMPREHENSIVE; Future    2. Intractable migraine without status migrainosus, unspecified migraine type    3. Erectile dysfunction, unspecified erectile dysfunction type    4. Screening for colon cancer  -     REFERRAL TO GASTROENTEROLOGY    Other orders  -     SUMAtriptan (IMITREX) 100 mg tablet; Take 1 Tablet by mouth daily as needed for Migraine.  -     metoprolol succinate (TOPROL-XL) 25 mg XL tablet; Take 1 Tablet by mouth daily.       As above  Patient stable  Labs as ordered  Refilled meds needed  An After Visit Summary was printed and given to the patient. This has been fully explained to the patient, who indicates understanding. Follow-up and Dispositions    Return in about 6 months (around 3/1/2023) for well exam.       This has been fully explained to the patient, who indicates understanding.       Follow-up and Dispositions    Return in about 6 months (around 3/1/2023) for well exam.            Evelina Abbott MD

## 2022-11-15 ENCOUNTER — HOSPITAL ENCOUNTER (OUTPATIENT)
Dept: LAB | Age: 52
Discharge: HOME | End: 2022-11-15

## 2022-11-15 LAB — XX-LABCORP SPECIMEN COL,LCBCF: NORMAL

## 2022-11-15 PROCEDURE — 99001 SPECIMEN HANDLING PT-LAB: CPT

## 2022-11-29 ENCOUNTER — OFFICE VISIT (OUTPATIENT)
Dept: FAMILY MEDICINE CLINIC | Age: 52
End: 2022-11-29
Payer: COMMERCIAL

## 2022-11-29 VITALS
OXYGEN SATURATION: 96 % | RESPIRATION RATE: 16 BRPM | DIASTOLIC BLOOD PRESSURE: 83 MMHG | WEIGHT: 192.2 LBS | TEMPERATURE: 98.2 F | HEIGHT: 68 IN | BODY MASS INDEX: 29.13 KG/M2 | HEART RATE: 88 BPM | SYSTOLIC BLOOD PRESSURE: 124 MMHG

## 2022-11-29 DIAGNOSIS — R93.5 ABNORMAL US (ULTRASOUND) OF ABDOMEN: ICD-10-CM

## 2022-11-29 DIAGNOSIS — M54.6 CHRONIC RIGHT-SIDED THORACIC BACK PAIN: ICD-10-CM

## 2022-11-29 DIAGNOSIS — G89.29 CHRONIC RIGHT-SIDED THORACIC BACK PAIN: ICD-10-CM

## 2022-11-29 DIAGNOSIS — R10.9 FLANK PAIN: Primary | ICD-10-CM

## 2022-11-29 LAB
BILIRUB UR QL STRIP: NEGATIVE
GLUCOSE UR-MCNC: NEGATIVE MG/DL
KETONES P FAST UR STRIP-MCNC: NEGATIVE MG/DL
PH UR STRIP: 6 [PH] (ref 4.6–8)
PROT UR QL STRIP: NEGATIVE
SP GR UR STRIP: 1.03 (ref 1–1.03)
UA UROBILINOGEN AMB POC: NORMAL (ref 0.2–1)
URINALYSIS CLARITY POC: CLEAR
URINALYSIS COLOR POC: YELLOW
URINE BLOOD POC: NEGATIVE
URINE LEUKOCYTES POC: NEGATIVE
URINE NITRITES POC: NEGATIVE

## 2022-11-29 PROCEDURE — 81003 URINALYSIS AUTO W/O SCOPE: CPT | Performed by: FAMILY MEDICINE

## 2022-11-29 PROCEDURE — 99214 OFFICE O/P EST MOD 30 MIN: CPT | Performed by: FAMILY MEDICINE

## 2022-11-29 NOTE — LETTER
11/29/2022    Mr. Etelvina Viveros  [address] [place] [id number]      Dear Etelvina Viveros:    Please find your most recent results below. Resulted Orders   METABOLIC PANEL, COMPREHENSIVE   Result Value Ref Range    Glucose 102 (H) 70 - 99 mg/dL    BUN 14 6 - 24 mg/dL    Creatinine 1.11 0.76 - 1.27 mg/dL    eGFR 80 >59 mL/min/1.73    BUN/Creatinine ratio 13 9 - 20    Sodium 140 134 - 144 mmol/L    Potassium 4.6 3.5 - 5.2 mmol/L    Chloride 103 96 - 106 mmol/L    CO2 21 20 - 29 mmol/L    Calcium 9.8 8.7 - 10.2 mg/dL    Protein, total 6.8 6.0 - 8.5 g/dL    Albumin 4.7 3.8 - 4.9 g/dL    GLOBULIN, TOTAL 2.1 1.5 - 4.5 g/dL    A-G Ratio 2.2 1.2 - 2.2    Bilirubin, total 0.4 0.0 - 1.2 mg/dL    Alk. phosphatase 54 44 - 121 IU/L    AST (SGOT) 24 0 - 40 IU/L    ALT (SGPT) 35 0 - 44 IU/L    Narrative    Performed at:  [address]  [id number]  : Tian Villanueva MD, Phone:  [phone]   LIPID PANEL   Result Value Ref Range    Cholesterol, total 219 (H) 100 - 199 mg/dL    Triglyceride 163 (H) 0 - 149 mg/dL    HDL Cholesterol 44 >39 mg/dL    VLDL, calculated 29 5 - 40 mg/dL    LDL, calculated 146 (H) 0 - 99 mg/dL    Narrative    Performed at:  [address] [employer] [address] [place] [address]  [id number]  : Tian Villanueva MD, Phone:  [phone]       RECOMMENDATIONS:  Please follow a strict lower carbohydrate diet and exercise as tolerated. Your blood sugar was slightly elevated.         Please call me if you have any questions: [phone]    Sincerely,      Jack Hampton MD

## 2022-11-29 NOTE — PROGRESS NOTES
1. \"Have you been to the ER, urgent care clinic since your last visit? Hospitalized since your last visit? \" No    2. \"Have you seen or consulted any other health care providers outside of the [address] [place] [address] since your last visit? \" No     3. For patients aged 39-70: Has the patient had a colonoscopy / FIT/ Cologuard?  No

## 2022-12-16 ENCOUNTER — HOSPITAL ENCOUNTER (OUTPATIENT)
Dept: ULTRASOUND IMAGING | Age: 52
Discharge: HOME | End: 2022-12-16
Attending: FAMILY MEDICINE
Payer: COMMERCIAL

## 2022-12-16 ENCOUNTER — HOSPITAL ENCOUNTER (OUTPATIENT)
Dept: GENERAL RADIOLOGY | Age: 52
End: 2022-12-16
Attending: FAMILY MEDICINE
Payer: COMMERCIAL

## 2022-12-16 DIAGNOSIS — E78.2 MIXED HYPERLIPIDEMIA: ICD-10-CM

## 2022-12-16 DIAGNOSIS — R10.9 FLANK PAIN: ICD-10-CM

## 2022-12-16 DIAGNOSIS — G89.29 CHRONIC RIGHT-SIDED THORACIC BACK PAIN: ICD-10-CM

## 2022-12-16 DIAGNOSIS — M54.6 CHRONIC RIGHT-SIDED THORACIC BACK PAIN: ICD-10-CM

## 2022-12-16 PROCEDURE — 72070 X-RAY EXAM THORAC SPINE 2VWS: CPT

## 2022-12-16 PROCEDURE — 76770 US EXAM ABDO BACK WALL COMP: CPT

## 2022-12-16 RX ORDER — SIMVASTATIN 10 MG/1
10 TABLET, FILM COATED ORAL
Qty: 90 TABLET | Refills: 1 | Status: SHIPPED | OUTPATIENT
Start: 2022-12-16

## 2022-12-16 RX ORDER — METOPROLOL SUCCINATE 25 MG/1
25 TABLET, EXTENDED RELEASE ORAL DAILY
Qty: 90 TABLET | Refills: 1 | Status: SHIPPED | OUTPATIENT
Start: 2022-12-16

## 2022-12-16 NOTE — TELEPHONE ENCOUNTER
This patient contacted office for the following prescriptions to be filled:    Last office visit: 11/29/22  Follow up appointment: Astrid Cristobal 5/29/23   Medication requested :   Requested Prescriptions     Pending Prescriptions Disp Refills    metoprolol succinate (TOPROL-XL) 25 mg XL tablet 90 Tablet 1     Sig: Take 1 Tablet by mouth daily. simvastatin (ZOCOR) 10 mg tablet 90 Tablet 1     Sig: Take 1 Tablet by mouth nightly.  T       PCP: Dr. Enedina Li   Mail order or Local pharmacy name  [facility]   [facility],   [address] Eugenio Stevens

## 2022-12-23 ENCOUNTER — TELEPHONE (OUTPATIENT)
Dept: FAMILY MEDICINE CLINIC | Age: 52
End: 2022-12-23

## 2022-12-23 NOTE — TELEPHONE ENCOUNTER
Patient trans from ecc, states recvd a call from scheduling attempting to set up for an MRI, pt requesting call to verify what it is for states was not aware of order for one, also adv pt to sign perm to release at next ov to add spouse.

## 2022-12-27 NOTE — TELEPHONE ENCOUNTER
Called patient back to let know MRI has been ordered due to abnormal ultrasound showing a \"bulge\" on kidney with no evidence of a definitive mass. Patient stated seeing message on my chart after hanging up with us on Friday 12-23. Had no further questions stated scheduling MRI for 01-.

## 2023-01-20 ENCOUNTER — HOSPITAL ENCOUNTER (OUTPATIENT)
Age: 53
End: 2023-01-20
Attending: FAMILY MEDICINE
Payer: COMMERCIAL

## 2023-01-20 DIAGNOSIS — R93.5 ABNORMAL US (ULTRASOUND) OF ABDOMEN: ICD-10-CM

## 2023-01-20 DIAGNOSIS — R10.9 FLANK PAIN: ICD-10-CM

## 2023-01-20 PROCEDURE — 74183 MRI ABD W/O CNTR FLWD CNTR: CPT

## 2023-01-20 PROCEDURE — A9577 INJ MULTIHANCE: HCPCS | Performed by: FAMILY MEDICINE

## 2023-01-20 PROCEDURE — 74011250636 HC RX REV CODE- 250/636: Performed by: FAMILY MEDICINE

## 2023-01-20 RX ADMIN — GADOBENATE DIMEGLUMINE 20 ML: 529 INJECTION, SOLUTION INTRAVENOUS at 10:27

## 2023-02-13 ENCOUNTER — TELEMEDICINE (OUTPATIENT)
Age: 53
End: 2023-02-13
Payer: COMMERCIAL

## 2023-02-13 DIAGNOSIS — R10.9 RIGHT FLANK PAIN: ICD-10-CM

## 2023-02-13 DIAGNOSIS — R93.5 ABNORMAL FINDINGS ON DIAGNOSTIC IMAGING OF OTHER ABDOMINAL REGIONS, INCLUDING RETROPERITONEUM: Primary | ICD-10-CM

## 2023-02-13 PROCEDURE — 99214 OFFICE O/P EST MOD 30 MIN: CPT | Performed by: FAMILY MEDICINE

## 2023-02-13 NOTE — PROGRESS NOTES
2023    TELEHEALTH EVALUATION -- Audio/Visual (During IJRGA-44 public health emergency)    HPI:    Zoran Fritz (:  1970) has requested an audio/video evaluation for the following concern(s):      Patient is here to follow-up on his MRI he had of the  kidney. Patient has had a pain in the right flank area for the last 2+ years. He has felt a fullness over on the right side and has become concerned about this. He denies having any kidney stones. He denies any pain or burning with urination. MRI reviewed with the patient. MRI revealed evidence of lobulated contours of the right kidney and some hepatic cysts. There was no renal mass seen. Lab Results   Component Value Date     11/15/2022    K 4.6 11/15/2022     11/15/2022    CO2 21 11/15/2022    BUN 14 11/15/2022    CREATININE 1.11 11/15/2022    GLUCOSE 102 (H) 11/15/2022    CALCIUM 9.8 11/15/2022    PROT 6.8 11/15/2022    LABALBU 4.7 11/15/2022    BILITOT 0.4 11/15/2022    ALKPHOS 54 11/15/2022    AST 24 11/15/2022    ALT 35 11/15/2022    LABGLOM 80 11/15/2022    GFRAA 72 2020    AGRATIO 2.2 11/15/2022     Patient liver test and kidney test are stable. Review of Systems  As per HPI    Prior to Visit Medications    Not on File       Social History     Tobacco Use    Smoking status: Never    Smokeless tobacco: Never   Substance Use Topics    Alcohol use: Yes    Drug use:  No            PHYSICAL EXAMINATION:  [ INSTRUCTIONS:  \"[]\" Indicates a positive item  \"[]\" Indicates a negative item  -- DELETE ALL ITEMS NOT EXAMINED]  Vital Signs: (As obtained by patient/caregiver or practitioner observation)        Constitutional: [x] Appears well-developed and well-nourished [x] No apparent distress      [] Abnormal-   Mental status  [x] Alert and awake  [x] Oriented to person/place/time []Able to follow commands      Eyes:  EOM    [x]  Normal  [] Abnormal-  Sclera  [x]  Normal  [] Abnormal -         Discharge [x]  None visible  [] Abnormal -    HENT:   [x] Normocephalic, atraumatic. [] Abnormal   [] Mouth/Throat: Mucous membranes are moist.     External Ears [x] Normal  [] Abnormal-     Neck: [x] No visualized mass     Pulmonary/Chest: [x] Respiratory effort normal.  [x] No visualized signs of difficulty breathing or respiratory distress        [] Abnormal-      Musculoskeletal:   [] Normal gait with no signs of ataxia         [] Normal range of motion of neck        [] Abnormal-       Neurological:        [x] No Facial Asymmetry (Cranial nerve 7 motor function) (limited exam to video visit)          [] No gaze palsy        [] Abnormal-         Skin:        [x] No significant exanthematous lesions or discoloration noted on facial skin         [] Abnormal-            Psychiatric:       [x] Normal Affect [] No Hallucinations        [] Abnormal-     Other pertinent observable physical exam findings-     ASSESSMENT/PLAN:  Diagnoses and all orders for this visit:    Abnormal findings on diagnostic imaging of other abdominal regions, including retroperitoneum  -     [facility]    Right flank pain  -     [facility]    As above  Patient reassured regarding the findings on his MRI. He is still concerned and therefore will seek another opinion with urology. Urology referral placed. Return if symptoms worsen or fail to improve. Makeda Travis, was evaluated through a synchronous (real-time) audio-video encounter. The patient (or guardian if applicable) is aware that this is a billable service, which includes applicable co-pays. This Virtual Visit was conducted with patient's (and/or legal guardian's) consent. The visit was conducted pursuant to the emergency declaration under the [address] [place] [address] authority and the [employer] Act. Patient identification was verified, and a caregiver was present when appropriate. The patient was located at Home: [address] [place] [address]  Provider was located at [facility] (Philip [facility]ias Dept): [address],  [address] [address] [facility], [place]    An electronic signature was used to authenticate this note.

## 2023-09-18 NOTE — TELEPHONE ENCOUNTER
Last Visit: 02- VV   Next Appointment: none  Previous Refill Encounter: metoprolol on 12- #90 tabs with 1 refills  Simvastatin on 12- # 90 tabs  with 1 refill      Requested Prescriptions     Pending Prescriptions Disp Refills    simvastatin (ZOCOR) 10 MG tablet [Pharmacy Med Name: SIMVASTATIN 10MG TABS] 90 tablet 1     Sig: TAKE ONE TABLET BY MOUTH EVERY NIGHT    metoprolol succinate (TOPROL XL) 25 MG extended release tablet [Pharmacy Med Name: METOPROLOL SUCCINATE ER 25MG TB24] 90 tablet 1     Sig: TAKE ONE TABLET BY MOUTH EVERY DAY

## 2023-09-22 NOTE — TELEPHONE ENCOUNTER
Patient called to follow up on his medication refill Patient asked how much longer. I informed him that it will  be filled soon. Patient expressed understanding and just asked if I could send I a message.

## 2023-09-25 RX ORDER — SIMVASTATIN 10 MG
10 TABLET ORAL
Qty: 90 TABLET | Refills: 1 | Status: SHIPPED | OUTPATIENT
Start: 2023-09-25

## 2023-09-25 RX ORDER — METOPROLOL SUCCINATE 25 MG/1
25 TABLET, EXTENDED RELEASE ORAL DAILY
Qty: 90 TABLET | Refills: 1 | Status: SHIPPED | OUTPATIENT
Start: 2023-09-25

## 2023-10-06 NOTE — TELEPHONE ENCOUNTER
Last Visit: 02- VV   Next Appointment: none  Previous Refill Encounter: 09-     Requested Prescriptions     Pending Prescriptions Disp Refills    SUMAtriptan (IMITREX) 100 MG tablet [Pharmacy Med Name: SUMATRIPTAN SUCCINATE 100MG TABS] 9 tablet 4     Sig: TAKE ONE TABLET BY MOUTH EVERY DAY AS NEEDED FOR MIGRAINE

## 2023-10-15 RX ORDER — SUMATRIPTAN 100 MG/1
TABLET, FILM COATED ORAL
Qty: 9 TABLET | Refills: 4 | Status: SHIPPED | OUTPATIENT
Start: 2023-10-15

## 2023-10-27 ENCOUNTER — TELEPHONE (OUTPATIENT)
Age: 53
End: 2023-10-27

## 2023-10-27 NOTE — TELEPHONE ENCOUNTER
----- Message from [employer], [facility] sent at 10/26/2023 10:47 AM EDT -----  Subject: Appointment Request    Reason for Call: Established Patient Appointment needed: Routine Existing   Condition Follow Up    QUESTIONS    Reason for appointment request? Available appointments did not meet   patient need     Additional Information for Provider? Pt called requesting an appointment   to follow up on ongoing pain x2 years. Pt states that he would like to be   seen in person for this follow up. Our system is only showing virtual   visits through the end of the year.  Please return call to pt to schedule.   ---------------------------------------------------------------------------  --------------  Alejandro PRINGLE  [phone]; OK to leave message on voicemail  ---------------------------------------------------------------------------  --------------  SCRIPT ANSWERS

## 2024-01-15 ENCOUNTER — TELEMEDICINE (OUTPATIENT)
Age: 54
End: 2024-01-15
Payer: COMMERCIAL

## 2024-01-15 DIAGNOSIS — K42.9 UMBILICAL HERNIA WITHOUT OBSTRUCTION AND WITHOUT GANGRENE: Primary | ICD-10-CM

## 2024-01-15 DIAGNOSIS — E78.2 MIXED HYPERLIPIDEMIA: ICD-10-CM

## 2024-01-15 DIAGNOSIS — I48.0 PAROXYSMAL ATRIAL FIBRILLATION (HCC): ICD-10-CM

## 2024-01-15 DIAGNOSIS — M54.41 CHRONIC RIGHT-SIDED LOW BACK PAIN WITH RIGHT-SIDED SCIATICA: ICD-10-CM

## 2024-01-15 DIAGNOSIS — G89.29 CHRONIC RIGHT-SIDED LOW BACK PAIN WITH RIGHT-SIDED SCIATICA: ICD-10-CM

## 2024-01-15 DIAGNOSIS — R73.9 ELEVATED BLOOD SUGAR: ICD-10-CM

## 2024-01-15 PROCEDURE — 99214 OFFICE O/P EST MOD 30 MIN: CPT | Performed by: FAMILY MEDICINE

## 2024-01-15 SDOH — ECONOMIC STABILITY: FOOD INSECURITY: WITHIN THE PAST 12 MONTHS, THE FOOD YOU BOUGHT JUST DIDN'T LAST AND YOU DIDN'T HAVE MONEY TO GET MORE.: NEVER TRUE

## 2024-01-15 SDOH — ECONOMIC STABILITY: HOUSING INSECURITY
IN THE LAST 12 MONTHS, WAS THERE A TIME WHEN YOU DID NOT HAVE A STEADY PLACE TO SLEEP OR SLEPT IN A SHELTER (INCLUDING NOW)?: NO

## 2024-01-15 SDOH — ECONOMIC STABILITY: FOOD INSECURITY: WITHIN THE PAST 12 MONTHS, YOU WORRIED THAT YOUR FOOD WOULD RUN OUT BEFORE YOU GOT MONEY TO BUY MORE.: NEVER TRUE

## 2024-01-15 SDOH — ECONOMIC STABILITY: INCOME INSECURITY: HOW HARD IS IT FOR YOU TO PAY FOR THE VERY BASICS LIKE FOOD, HOUSING, MEDICAL CARE, AND HEATING?: NOT HARD AT ALL

## 2024-01-15 ASSESSMENT — PATIENT HEALTH QUESTIONNAIRE - PHQ9
2. FEELING DOWN, DEPRESSED OR HOPELESS: 0
SUM OF ALL RESPONSES TO PHQ QUESTIONS 1-9: 0
SUM OF ALL RESPONSES TO PHQ QUESTIONS 1-9: 0
1. LITTLE INTEREST OR PLEASURE IN DOING THINGS: 0
SUM OF ALL RESPONSES TO PHQ QUESTIONS 1-9: 0
SUM OF ALL RESPONSES TO PHQ QUESTIONS 1-9: 0

## 2024-01-15 ASSESSMENT — ANXIETY QUESTIONNAIRES
IF YOU CHECKED OFF ANY PROBLEMS ON THIS QUESTIONNAIRE, HOW DIFFICULT HAVE THESE PROBLEMS MADE IT FOR YOU TO DO YOUR WORK, TAKE CARE OF THINGS AT HOME, OR GET ALONG WITH OTHER PEOPLE: NOT DIFFICULT AT ALL
5. BEING SO RESTLESS THAT IT IS HARD TO SIT STILL: 0
GAD7 TOTAL SCORE: 0
1. FEELING NERVOUS, ANXIOUS, OR ON EDGE: 0
4. TROUBLE RELAXING: 0
2. NOT BEING ABLE TO STOP OR CONTROL WORRYING: 0
7. FEELING AFRAID AS IF SOMETHING AWFUL MIGHT HAPPEN: 0
3. WORRYING TOO MUCH ABOUT DIFFERENT THINGS: 0
6. BECOMING EASILY ANNOYED OR IRRITABLE: 0

## 2024-01-15 NOTE — PROGRESS NOTES
HPI:  Nathaniel Leigh is a 53 y.o. male who presents today with   Chief Complaint   Patient presents with    Other     Hernia (above belly button) x1yr with worsening        {HPI:[id number]}                  No data to display                        PMH,  Meds, Allergies, Family History, Social history reviewed      Current Outpatient Medications   Medication Sig Dispense Refill    SUMAtriptan (IMITREX) 100 MG tablet TAKE ONE TABLET BY MOUTH EVERY DAY AS NEEDED FOR MIGRAINE 9 tablet 4    simvastatin (ZOCOR) 10 MG tablet TAKE ONE TABLET BY MOUTH EVERY NIGHT 90 tablet 1    metoprolol succinate (TOPROL XL) 25 MG extended release tablet TAKE ONE TABLET BY MOUTH EVERY DAY 90 tablet 1    tadalafil (CIALIS) 5 MG tablet TAKE ONE TABLET BY MOUTH EVERY DAY. (Patient not taking: Reported on 5/19/2023)       No current facility-administered medications for this visit.        No Known Allergies               Review of Systems        There were no vitals taken for this visit.  [unfilled]    Assessment/Plan:    There are no diagnoses linked to this encounter.     No follow-up provider specified.       Mamie Robledo MD

## 2024-01-15 NOTE — PROGRESS NOTES
Nathaniel Leigh, was evaluated through a synchronous (real-time) audio-video encounter. The patient (or guardian if applicable) is aware that this is a billable service, which includes applicable co-pays. This Virtual Visit was conducted with patient's (and/or legal guardian's) consent. Patient identification was verified, and a caregiver was present when appropriate.   The patient was located at Home: [address] [facility]   [place] [id number]  Provider was located at Facility (Appt Dept): [address] , [facility] [address]  [place] [address]      Nathaniel Leigh (:  1970) is a Established patient, presenting virtually for evaluation of the following:    --Bettye Andre MA

## 2024-01-27 NOTE — PROGRESS NOTES
1/15/2024    TELEHEALTH EVALUATION -- Audio/Visual    HPI:    Nathaniel Leigh (:  1970) has requested an audio/video evaluation for the following concern(s):      Pt has  a hernia above the belly button for the last year.  This has been worsening over the last year   He would like to see a surgeon    Pt also requests a Cardiology referral .  He had an episode some time ago of Atrial fibrillation. He would like to follow up on this.   He has had no palpitations.         Review of Systems as per HPI    Prior to Visit Medications    Medication Sig Taking? Authorizing Provider   SUMAtriptan (IMITREX) 100 MG tablet TAKE ONE TABLET BY MOUTH EVERY DAY AS NEEDED FOR MIGRAINE Yes Mamie Robledo MD   simvastatin (ZOCOR) 10 MG tablet TAKE ONE TABLET BY MOUTH EVERY NIGHT Yes Mamie Robledo MD   metoprolol succinate (TOPROL XL) 25 MG extended release tablet TAKE ONE TABLET BY MOUTH EVERY DAY Yes Mamie Robledo MD       Social History     Tobacco Use    Smoking status: Never    Smokeless tobacco: Never   Vaping Use    Vaping Use: Never used   Substance Use Topics    Alcohol use: Yes     Alcohol/week: 20.0 standard drinks of alcohol     Types: 20 Cans of beer per week     Comment: Occasional drinking    Drug use: No        No Known Allergies,   Past Medical History:   Diagnosis Date    ADD (attention deficit disorder)     Anxiety     Arrhythmia     proximal A-fib    GERD (gastroesophageal reflux disease)     Hypertension     Ill-defined condition     ELEVATED CHOLESTEROL    Insomnia     Migraine    ,   Past Surgical History:   Procedure Laterality Date    BREAST BIOPSY  2014    BILATERAL BREAST BIOPSY performed by Demond Bradley MD at [facility] MAIN OR    MOHS SURGERY Right     ORTHOPEDIC SURGERY      rotator cuff    UROLOGICAL SURGERY  4/21/10    removal ervin epidydimytis due to pain from vasectomy; Dr. Arzoal    UROLOGICAL SURGERY      bilat vasectomy; Dr cash   ,   Social History     Tobacco Use    Smoking

## 2024-04-29 NOTE — PROGRESS NOTES
HPI:  Aly Fortune is a 46 y.o. male who presents today with   Chief Complaint   Patient presents with    Side Pain     Right lower side pain x 2 years         Patient has had pain in the right lower side and flank for the last couple of years. Patient does not report a history of kidney stones. He has no difficulty passing his urine. He sees no blood in the urine. Patient is concerned about his kidneys and agrees to imaging studies to further evaluate this. Patient does not report any chronic protein powder use. He is no longer taking creatine which he had taken once in the past.    He denies any injury. Patient states he may not always drink enough free water. Patient had an electrolyte panel November 15 and the results are as listed below. His GFR is stable. Lab Results   Component Value Date/Time    Sodium 140 11/15/2022 12:00 AM    Potassium 4.6 11/15/2022 12:00 AM    Chloride 103 11/15/2022 12:00 AM    CO2 21 11/15/2022 12:00 AM    Anion gap 18.0 10/17/2017 12:33 PM    Glucose 102 (H) 11/15/2022 12:00 AM    BUN 14 11/15/2022 12:00 AM    Creatinine 1.11 11/15/2022 12:00 AM    BUN/Creatinine ratio 13 11/15/2022 12:00 AM    GFR est AA 72 12/04/2020 10:48 AM    GFR est non-AA 62 12/04/2020 10:48 AM    Calcium 9.8 11/15/2022 12:00 AM    Bilirubin, total 0.4 11/15/2022 12:00 AM    Alk.  phosphatase 54 11/15/2022 12:00 AM    Protein, total 6.8 11/15/2022 12:00 AM    Albumin 4.7 11/15/2022 12:00 AM    Globulin 2.4 10/17/2017 12:33 PM    A-G Ratio 2.2 11/15/2022 12:00 AM    ALT (SGPT) 35 11/15/2022 12:00 AM    AST (SGOT) 24 11/15/2022 12:00 AM       Lab Results   Component Value Date/Time    Cholesterol, total 219 (H) 11/15/2022 12:00 AM    HDL Cholesterol 44 11/15/2022 12:00 AM    LDL, calculated 146 (H) 11/15/2022 12:00 AM    LDL, calculated 132 (H) 03/02/2020 11:37 AM    VLDL, calculated 29 11/15/2022 12:00 AM    VLDL, calculated 71 (H) 03/02/2020 11:37 AM    Triglyceride 163 (H) 11/15/2022 12:00 AM 3 most recent PHQ Screens 11/29/2022   Little interest or pleasure in doing things Not at all   Feeling down, depressed, irritable, or hopeless Not at all   Total Score PHQ 2 0               PMH,  Meds, Allergies, Family History, Social history reviewed      Current Outpatient Medications   Medication Sig Dispense Refill    SUMAtriptan (IMITREX) 100 mg tablet Take 1 Tablet by mouth daily as needed for Migraine. 9 Tablet 4    metoprolol succinate (TOPROL-XL) 25 mg XL tablet Take 1 Tablet by mouth daily. 90 Tablet 1    simvastatin (ZOCOR) 10 mg tablet Take 1 Tablet by mouth nightly. T 90 Tablet 1    tadalafiL (CIALIS) 5 mg tablet TAKE ONE TABLET BY MOUTH EVERY DAY. (Patient not taking: No sig reported) 90 Tab 3        No Known Allergies               ROS as per HPI      Visit Vitals  /83 (BP 1 Location: Right arm, BP Patient Position: Sitting, BP Cuff Size: Large adult)   Pulse 88   Temp 98.2 °F (36.8 °C) (Temporal)   Resp 16   Ht 5' 8\" (1.727 m)   Wt 192 lb 3.2 oz (87.2 kg)   SpO2 96%   BMI 29.22 kg/m²     Physical Exam    General appearance: alert, cooperative, no distress, appears stated age  Neck: supple, symmetrical, trachea midline, no adenopathy, thyroid: not enlarged, symmetric, no tenderness/mass/nodules, no carotid bruit and no JVD  Lungs: clear to auscultation bilaterally  Heart: regular rate and rhythm, S1, S2 normal, no murmur, click, rub or gallop  Abdomen: soft, non-tender. Bowel sounds normal. No masses,  no organomegaly  Extremities: extremities normal, atraumatic, no cyanosis or edema  There is no significant tenderness to palpation in the flank. There is some tenderness to the paraspinal muscles of the thoracic spine. UA noted  Assessment/Plan:  Diagnoses and all orders for this visit:    1. Flank pain  -     AMB POC URINALYSIS DIP STICK MANUAL W/O MICRO  -     2.  Chronic right-sided thoracic back pain  -     XR SPINE THORAC 2 V; Future      As above  Ultrasound of the kidney (this was apparently replaced by an ultrasound of the retroperitoneum)  Check x-ray of thoracic spine  Patient encouraged to decrease any caffeine intake and to increase his intake of free water  Follow-up and Dispositions    Return in about 6 months (around 5/29/2023) for well exam.       This has been fully explained to the patient, who indicates understanding. An After Visit Summary was printed and given to the patient.       Follow-up and Dispositions    Return in about 6 months (around 5/29/2023) for well exam.            Brenton Salazar MD Vaccine status unknown

## 2025-01-13 ENCOUNTER — COMMUNITY OUTREACH (OUTPATIENT)
Facility: CLINIC | Age: 55
End: 2025-01-13

## 2025-01-13 NOTE — PROGRESS NOTES
Patient's HM shows they are overdue for Colorectal Screening.   Care Everywhere and  files searched.   updated with 2016 Colonoscopy.